# Patient Record
Sex: FEMALE | Race: OTHER | NOT HISPANIC OR LATINO | URBAN - METROPOLITAN AREA
[De-identification: names, ages, dates, MRNs, and addresses within clinical notes are randomized per-mention and may not be internally consistent; named-entity substitution may affect disease eponyms.]

---

## 2017-06-08 ENCOUNTER — EMERGENCY (EMERGENCY)
Facility: HOSPITAL | Age: 42
LOS: 1 days | Discharge: HOME | End: 2017-06-08
Attending: EMERGENCY MEDICINE | Admitting: EMERGENCY MEDICINE
Payer: COMMERCIAL

## 2017-06-08 VITALS
OXYGEN SATURATION: 100 % | DIASTOLIC BLOOD PRESSURE: 81 MMHG | SYSTOLIC BLOOD PRESSURE: 139 MMHG | HEART RATE: 80 BPM | RESPIRATION RATE: 16 BRPM

## 2017-06-08 VITALS
HEART RATE: 83 BPM | SYSTOLIC BLOOD PRESSURE: 135 MMHG | DIASTOLIC BLOOD PRESSURE: 85 MMHG | TEMPERATURE: 98 F | OXYGEN SATURATION: 100 % | RESPIRATION RATE: 16 BRPM

## 2017-06-08 LAB
APPEARANCE UR: CLEAR — SIGNIFICANT CHANGE UP
BACTERIA # UR AUTO: SIGNIFICANT CHANGE UP
BILIRUB UR-MCNC: NEGATIVE — SIGNIFICANT CHANGE UP
BLOOD UR QL VISUAL: HIGH
COLOR SPEC: YELLOW — SIGNIFICANT CHANGE UP
GLUCOSE UR-MCNC: NEGATIVE — SIGNIFICANT CHANGE UP
HYALINE CASTS # UR AUTO: SIGNIFICANT CHANGE UP (ref 0–?)
KETONES UR-MCNC: NEGATIVE — SIGNIFICANT CHANGE UP
LEUKOCYTE ESTERASE UR-ACNC: NEGATIVE — SIGNIFICANT CHANGE UP
MUCOUS THREADS # UR AUTO: SIGNIFICANT CHANGE UP
NITRITE UR-MCNC: NEGATIVE — SIGNIFICANT CHANGE UP
PH UR: 6 — SIGNIFICANT CHANGE UP (ref 4.6–8)
PROT UR-MCNC: 20 — SIGNIFICANT CHANGE UP
RBC CASTS # UR COMP ASSIST: >50 — HIGH (ref 0–?)
SP GR SPEC: 1.02 — SIGNIFICANT CHANGE UP (ref 1–1.03)
SQUAMOUS # UR AUTO: SIGNIFICANT CHANGE UP
UROBILINOGEN FLD QL: NORMAL E.U. — SIGNIFICANT CHANGE UP (ref 0.1–0.2)
WBC UR QL: SIGNIFICANT CHANGE UP (ref 0–?)

## 2017-06-08 PROCEDURE — 99284 EMERGENCY DEPT VISIT MOD MDM: CPT

## 2017-06-08 PROCEDURE — 76830 TRANSVAGINAL US NON-OB: CPT | Mod: 26

## 2017-06-08 RX ORDER — IBUPROFEN 200 MG
600 TABLET ORAL ONCE
Qty: 0 | Refills: 0 | Status: COMPLETED | OUTPATIENT
Start: 2017-06-08 | End: 2017-06-08

## 2017-06-08 NOTE — ED PROVIDER NOTE - MEDICAL DECISION MAKING DETAILS
40 y/o female with known left ovarian cyst c/o right pelvic pain with onset of menses.  R ov cyst vs torsion vs uti vs ectopic vs toa.  Obtain ua, upreg, tvus, give nsaids, antic dc with close ob gyn f/u.

## 2017-06-08 NOTE — ED PROVIDER NOTE - CARE PLAN
Principal Discharge DX:	Pelvic pain in female  Instructions for follow-up, activity and diet:	You were seen today for your pelvic pain.  It is unclear what the cause is at this time.  Take ibuprofen for pain.  Follow up with your ob/gyn in 2-3 days for re-evaluation.  RETURN TO THE EMERGENCY DEPARTMENT IMMEDIATELY IF YOU DEVELOP SEVERE PAIN, CANNOT EAT OR DRINK, OR FOR ANY OTHER CONCERN.

## 2017-06-08 NOTE — ED PROVIDER NOTE - PLAN OF CARE
You were seen today for your pelvic pain.  It is unclear what the cause is at this time.  Take ibuprofen for pain.  Follow up with your ob/gyn in 2-3 days for re-evaluation.  RETURN TO THE EMERGENCY DEPARTMENT IMMEDIATELY IF YOU DEVELOP SEVERE PAIN, CANNOT EAT OR DRINK, OR FOR ANY OTHER CONCERN.

## 2017-06-08 NOTE — ED PROVIDER NOTE - OBJECTIVE STATEMENT
42 y/o female with h/o left ovarian cyst with c/o right pelvic pain x1 day.  Gradual onset, aching in nature, constant, no known aggravating factors, taking ibuprofen with some efficacy.  LMP began yesterday, normal flow.  Denies f/c, ha, neck stiffness, cp, sob, cough, n/v/d, dysuria, rash.

## 2017-06-08 NOTE — ED ADULT NURSE NOTE - OBJECTIVE STATEMENT
Pt. is a 40 y/o female a&oc4 with c/c of abdominal pain across lower abdomen, tender to touch, patient reports pain is more localized to RLQ.  Patient reports 1 out of 10 pain after taking Aleve unknown dose at home.  LMP began yesterday, patient reported this is a week early and this is irregular for her.  Hx of left ovarian cyst.  Denies N/V, urinary and bowel symptoms, fever/chills, chest pain, SOB.  VSS patient in nad at this time.

## 2017-06-08 NOTE — ED ADULT TRIAGE NOTE - CHIEF COMPLAINT QUOTE
Patient c/o of pelvic pain since this morning LMP 6/7/2017 pt report having a cyst on the left ovary. Pt also reports having a stillbirth 9/19/2016.

## 2017-08-21 ENCOUNTER — TRANSCRIPTION ENCOUNTER (OUTPATIENT)
Age: 42
End: 2017-08-21

## 2017-08-22 ENCOUNTER — RESULT REVIEW (OUTPATIENT)
Age: 42
End: 2017-08-22

## 2017-08-22 ENCOUNTER — OUTPATIENT (OUTPATIENT)
Dept: EMERGENCY DEPT | Facility: HOSPITAL | Age: 42
LOS: 1 days | Discharge: HOME | End: 2017-08-22
Payer: COMMERCIAL

## 2017-08-22 VITALS
DIASTOLIC BLOOD PRESSURE: 76 MMHG | OXYGEN SATURATION: 99 % | HEART RATE: 100 BPM | SYSTOLIC BLOOD PRESSURE: 116 MMHG | RESPIRATION RATE: 16 BRPM

## 2017-08-22 VITALS
HEART RATE: 85 BPM | DIASTOLIC BLOOD PRESSURE: 99 MMHG | RESPIRATION RATE: 20 BRPM | TEMPERATURE: 99 F | SYSTOLIC BLOOD PRESSURE: 133 MMHG | OXYGEN SATURATION: 100 %

## 2017-08-22 DIAGNOSIS — N83.519 TORSION OF OVARY AND OVARIAN PEDICLE, UNSPECIFIED SIDE: ICD-10-CM

## 2017-08-22 DIAGNOSIS — N83.202 UNSPECIFIED OVARIAN CYST, LEFT SIDE: ICD-10-CM

## 2017-08-22 LAB
ALBUMIN SERPL ELPH-MCNC: 4.4 G/DL — SIGNIFICANT CHANGE UP (ref 3.3–5)
ALP SERPL-CCNC: 49 U/L — SIGNIFICANT CHANGE UP (ref 40–120)
ALT FLD-CCNC: 11 U/L — SIGNIFICANT CHANGE UP (ref 4–33)
APPEARANCE UR: CLEAR — SIGNIFICANT CHANGE UP
AST SERPL-CCNC: 14 U/L — SIGNIFICANT CHANGE UP (ref 4–32)
BASE EXCESS BLDV CALC-SCNC: 1.5 MMOL/L — SIGNIFICANT CHANGE UP
BASOPHILS # BLD AUTO: 0.03 K/UL — SIGNIFICANT CHANGE UP (ref 0–0.2)
BASOPHILS NFR BLD AUTO: 0.3 % — SIGNIFICANT CHANGE UP (ref 0–2)
BILIRUB SERPL-MCNC: 0.6 MG/DL — SIGNIFICANT CHANGE UP (ref 0.2–1.2)
BILIRUB UR-MCNC: NEGATIVE — SIGNIFICANT CHANGE UP
BLD GP AB SCN SERPL QL: NEGATIVE — SIGNIFICANT CHANGE UP
BLOOD GAS VENOUS - CREATININE: 0.7 MG/DL — SIGNIFICANT CHANGE UP (ref 0.5–1.3)
BLOOD UR QL VISUAL: NEGATIVE — SIGNIFICANT CHANGE UP
BUN SERPL-MCNC: 9 MG/DL — SIGNIFICANT CHANGE UP (ref 7–23)
CALCIUM SERPL-MCNC: 9.7 MG/DL — SIGNIFICANT CHANGE UP (ref 8.4–10.5)
CHLORIDE BLDV-SCNC: 107 MMOL/L — SIGNIFICANT CHANGE UP (ref 96–108)
CHLORIDE SERPL-SCNC: 104 MMOL/L — SIGNIFICANT CHANGE UP (ref 98–107)
CO2 SERPL-SCNC: 22 MMOL/L — SIGNIFICANT CHANGE UP (ref 22–31)
COLOR SPEC: SIGNIFICANT CHANGE UP
CREAT SERPL-MCNC: 0.66 MG/DL — SIGNIFICANT CHANGE UP (ref 0.5–1.3)
EOSINOPHIL # BLD AUTO: 0.12 K/UL — SIGNIFICANT CHANGE UP (ref 0–0.5)
EOSINOPHIL NFR BLD AUTO: 1.3 % — SIGNIFICANT CHANGE UP (ref 0–6)
GAS PNL BLDV: 137 MMOL/L — SIGNIFICANT CHANGE UP (ref 136–146)
GLUCOSE BLDV-MCNC: 90 — SIGNIFICANT CHANGE UP (ref 70–99)
GLUCOSE SERPL-MCNC: 93 MG/DL — SIGNIFICANT CHANGE UP (ref 70–99)
GLUCOSE UR-MCNC: NEGATIVE — SIGNIFICANT CHANGE UP
HCG SERPL-ACNC: < 5 MIU/ML — SIGNIFICANT CHANGE UP
HCO3 BLDV-SCNC: 24 MMOL/L — SIGNIFICANT CHANGE UP (ref 20–27)
HCT VFR BLD CALC: 39.3 % — SIGNIFICANT CHANGE UP (ref 34.5–45)
HCT VFR BLDV CALC: 42.4 % — SIGNIFICANT CHANGE UP (ref 34.5–45)
HGB BLD-MCNC: 13.4 G/DL — SIGNIFICANT CHANGE UP (ref 11.5–15.5)
HGB BLDV-MCNC: 13.8 G/DL — SIGNIFICANT CHANGE UP (ref 11.5–15.5)
IMM GRANULOCYTES # BLD AUTO: 0.04 # — SIGNIFICANT CHANGE UP
IMM GRANULOCYTES NFR BLD AUTO: 0.4 % — SIGNIFICANT CHANGE UP (ref 0–1.5)
KETONES UR-MCNC: NEGATIVE — SIGNIFICANT CHANGE UP
LACTATE BLDV-MCNC: 1 MMOL/L — SIGNIFICANT CHANGE UP (ref 0.5–2)
LEUKOCYTE ESTERASE UR-ACNC: NEGATIVE — SIGNIFICANT CHANGE UP
LIDOCAIN IGE QN: 18.1 U/L — SIGNIFICANT CHANGE UP (ref 7–60)
LYMPHOCYTES # BLD AUTO: 2.75 K/UL — SIGNIFICANT CHANGE UP (ref 1–3.3)
LYMPHOCYTES # BLD AUTO: 30.4 % — SIGNIFICANT CHANGE UP (ref 13–44)
MCHC RBC-ENTMCNC: 31 PG — SIGNIFICANT CHANGE UP (ref 27–34)
MCHC RBC-ENTMCNC: 34.1 % — SIGNIFICANT CHANGE UP (ref 32–36)
MCV RBC AUTO: 91 FL — SIGNIFICANT CHANGE UP (ref 80–100)
MONOCYTES # BLD AUTO: 0.5 K/UL — SIGNIFICANT CHANGE UP (ref 0–0.9)
MONOCYTES NFR BLD AUTO: 5.5 % — SIGNIFICANT CHANGE UP (ref 2–14)
MUCOUS THREADS # UR AUTO: SIGNIFICANT CHANGE UP
NEUTROPHILS # BLD AUTO: 5.62 K/UL — SIGNIFICANT CHANGE UP (ref 1.8–7.4)
NEUTROPHILS NFR BLD AUTO: 62.1 % — SIGNIFICANT CHANGE UP (ref 43–77)
NITRITE UR-MCNC: NEGATIVE — SIGNIFICANT CHANGE UP
NRBC # FLD: 0 — SIGNIFICANT CHANGE UP
PCO2 BLDV: 51 MMHG — SIGNIFICANT CHANGE UP (ref 41–51)
PH BLDV: 7.34 PH — SIGNIFICANT CHANGE UP (ref 7.32–7.43)
PH UR: 7 — SIGNIFICANT CHANGE UP (ref 4.6–8)
PLATELET # BLD AUTO: 266 K/UL — SIGNIFICANT CHANGE UP (ref 150–400)
PMV BLD: 10.8 FL — SIGNIFICANT CHANGE UP (ref 7–13)
PO2 BLDV: 27 MMHG — LOW (ref 35–40)
POTASSIUM BLDV-SCNC: 3.4 MMOL/L — SIGNIFICANT CHANGE UP (ref 3.4–4.5)
POTASSIUM SERPL-MCNC: 3.6 MMOL/L — SIGNIFICANT CHANGE UP (ref 3.5–5.3)
POTASSIUM SERPL-SCNC: 3.6 MMOL/L — SIGNIFICANT CHANGE UP (ref 3.5–5.3)
PROT SERPL-MCNC: 7.8 G/DL — SIGNIFICANT CHANGE UP (ref 6–8.3)
PROT UR-MCNC: NEGATIVE — SIGNIFICANT CHANGE UP
RBC # BLD: 4.32 M/UL — SIGNIFICANT CHANGE UP (ref 3.8–5.2)
RBC # FLD: 12.2 % — SIGNIFICANT CHANGE UP (ref 10.3–14.5)
RBC CASTS # UR COMP ASSIST: SIGNIFICANT CHANGE UP (ref 0–?)
RH IG SCN BLD-IMP: POSITIVE — SIGNIFICANT CHANGE UP
SAO2 % BLDV: 40.1 % — LOW (ref 60–85)
SODIUM SERPL-SCNC: 141 MMOL/L — SIGNIFICANT CHANGE UP (ref 135–145)
SP GR SPEC: 1 — SIGNIFICANT CHANGE UP (ref 1–1.03)
SQUAMOUS # UR AUTO: SIGNIFICANT CHANGE UP
UROBILINOGEN FLD QL: NORMAL E.U. — SIGNIFICANT CHANGE UP (ref 0.1–0.2)
WBC # BLD: 9.06 K/UL — SIGNIFICANT CHANGE UP (ref 3.8–10.5)
WBC # FLD AUTO: 9.06 K/UL — SIGNIFICANT CHANGE UP (ref 3.8–10.5)

## 2017-08-22 PROCEDURE — 88341 IMHCHEM/IMCYTCHM EA ADD ANTB: CPT | Mod: 26

## 2017-08-22 PROCEDURE — 88305 TISSUE EXAM BY PATHOLOGIST: CPT | Mod: 26

## 2017-08-22 PROCEDURE — 88342 IMHCHEM/IMCYTCHM 1ST ANTB: CPT | Mod: 26

## 2017-08-22 RX ORDER — OXYCODONE HYDROCHLORIDE 5 MG/1
1 TABLET ORAL
Qty: 15 | Refills: 0 | OUTPATIENT
Start: 2017-08-22

## 2017-08-22 RX ORDER — SODIUM CHLORIDE 9 MG/ML
1000 INJECTION, SOLUTION INTRAVENOUS
Qty: 0 | Refills: 0 | Status: DISCONTINUED | OUTPATIENT
Start: 2017-08-22 | End: 2017-08-22

## 2017-08-22 RX ORDER — FENTANYL CITRATE 50 UG/ML
50 INJECTION INTRAVENOUS
Qty: 0 | Refills: 0 | Status: DISCONTINUED | OUTPATIENT
Start: 2017-08-22 | End: 2017-08-22

## 2017-08-22 RX ORDER — ONDANSETRON 8 MG/1
4 TABLET, FILM COATED ORAL ONCE
Qty: 0 | Refills: 0 | Status: DISCONTINUED | OUTPATIENT
Start: 2017-08-22 | End: 2017-08-22

## 2017-08-22 RX ADMIN — SODIUM CHLORIDE 125 MILLILITER(S): 9 INJECTION, SOLUTION INTRAVENOUS at 14:15

## 2017-08-22 RX ADMIN — FENTANYL CITRATE 50 MICROGRAM(S): 50 INJECTION INTRAVENOUS at 15:05

## 2017-08-22 RX ADMIN — FENTANYL CITRATE 50 MICROGRAM(S): 50 INJECTION INTRAVENOUS at 14:50

## 2017-08-22 NOTE — H&P ADULT - HISTORY OF PRESENT ILLNESS
42 y/o  LMP  presenting for suprapubic pain that has persisted for the past 5 days. She reports that she originally went to Dr. Ruiz's office on  for this pain but it has intermittently come and go since then. She tried taking a pain medication she does not remember the name of that she was prescribed for it but vomited that medication up. She says the pain is "like labor pain" in nature and that it got bad again this morning so that's why she came in. She denies any current N/V, fevers, chills, CP, SOB, VB, or vaginal discharge  OBHx: 1x  in 2013, 1x 38w fetal demise delivered vaginally in 2016  GynHx: Known left ovarian cyst, previously 10 cm on U/S  PMH: Denies  SHx: Denies  All: NKDA  Meds: None

## 2017-08-22 NOTE — H&P ADULT - NSHPLABSRESULTS_GEN_ALL_CORE
13.4   9.06  )-----------( 266      (  @ 07:15 )             39.3     Urinalysis Basic - ( 22 Aug 2017 07:15 )    Color: PLYEL / Appearance: CLEAR / S.005 / pH: 7.0  Gluc: NEGATIVE / Ketone: NEGATIVE  / Bili: NEGATIVE / Urobili: NORMAL E.U.   Blood: NEGATIVE / Protein: NEGATIVE / Nitrite: NEGATIVE   Leuk Esterase: NEGATIVE / RBC: 0-2 / WBC x   Sq Epi: OCC / Non Sq Epi: x / Bacteria: x    < from: US Transvaginal (17 @ 14:40) >    EXAM:  US TRANSVAGINAL        PROCEDURE DATE:  2017         INTERPRETATION:  CLINICAL INFORMATION: Cramps. Left ovarian cyst.    LMP: 6717    COMPARISON: None available.    TECHNIQUE: Transabdominal and Endovaginal pelvic sonogram.        FINDINGS:    Uterus: 7.9 x 4 x 4.8 cm. Within normal limits.  Endometrium: 7 mm. Within normal limits.  Right ovary: Not visualized.  Left ovary: There is a 10.2 x 9 x 9.2 cm left ovarian cyst.  Free fluid: None.    IMPRESSION:    A 10 cm left ovarian cyst; given the size, torsion should be excluded   clinically.    Right ovary is not visualized.    < end of copied text >

## 2017-08-22 NOTE — ED ADULT TRIAGE NOTE - CHIEF COMPLAINT QUOTE
Pt. developed lower abdominal pain radiating to right lower back that started 2 days ago. Pt. appears comfortable at triage. LMP 8/2/17.

## 2017-08-22 NOTE — H&P ADULT - ASSESSMENT
40 y/o  LMP  with pelvic pain over pelvic mass and known 10 cm ovarian cyst from TVUS on , strong concern for ovarian torsion

## 2017-08-22 NOTE — ED ADULT NURSE NOTE - OBJECTIVE STATEMENT
rec'd pt in room 19 c/o suprapubic pain for 1 day... states ha hx of left ovarian cyst that had enlarged recently...told by her obgyn if pain incrased to come to ed.  denies nausea, vomiting, diarrhea, dysuria, vag discharbe.  appears comfortable. 20 gauge inserted rigth ac. blood and urine sent.  being examined by md.

## 2017-08-22 NOTE — ASU DISCHARGE PLAN (ADULT/PEDIATRIC). - NURSING INSTRUCTIONS
Stable for discharge  Do not take pain medication on an empty stomach.  Increase fluids and fiber in diet to prevent constipation. Stable for discharge

## 2017-08-22 NOTE — ASU DISCHARGE PLAN (ADULT/PEDIATRIC). - NOTIFY
Swelling that continues/Inability to Tolerate Liquids or Foods/Unable to Urinate/Persistent Nausea and Vomiting/GYN Fever>100.4/Pain not relieved by Medications/Bleeding that does not stop

## 2017-08-22 NOTE — ED PROVIDER NOTE - MEDICAL DECISION MAKING DETAILS
40yo F pmh 10cm L ovarian cyst p/w suprapubic abdominal pain radiating to RLQ & R flank. likely 2/2 to ovarian cyst vs ruptured cyst vs fibroid. no concerns for appendicitis, nephrolithiasis or ovarian torsion on exam. will r/o pregnancy & ectopic pregnancy  -labs -tvus -hcg -analgesia -obgyn consulted

## 2017-08-22 NOTE — ASU DISCHARGE PLAN (ADULT/PEDIATRIC). - ACTIVITY LEVEL
no heavy lifting/no intercourse/no douching/nothing per rectum/no tub baths/weight bearing as tolerated/nothing per vagina/no tampons no intercourse/weight bearing as tolerated/no sports/gym/nothing per rectum/no douching/no heavy lifting/nothing per vagina/no tampons/no tub baths

## 2017-08-22 NOTE — ED PROVIDER NOTE - ATTENDING CONTRIBUTION TO CARE
I was physically present for the E/M service provided. I agree with above history, physical, and plan which I have reviewed and edited where appropriate. I was physically present for the key portions of the service provided.    41F PMH left ovarian cysts p/w 4 days suprapubic pain a/w mild dysuria. No N/V/D. Sent by her gyn to ED. Abdomen soft, + suprapubic TTP. TV US evaluated for cysts, UA r/o UTI vs hematuria.

## 2017-08-22 NOTE — H&P ADULT - PROBLEM SELECTOR PLAN 1
- Pt added on to the OR schedule for laparoscopic left ovarian cystectomy, possible detorsion, possible salpingectomy, possible oophorectomy, possible exploratory laparotomy. Consents signed  - T&S and PT/PTT/INR  - NPO    Pt seen and examined with Dr. Alex Bernard, PGY-2  [phone]

## 2017-08-22 NOTE — BRIEF OPERATIVE NOTE - PROCEDURE
Ovarian torsion release  08/22/2017    Active  [facility]  Ovarian cystectomy  08/22/2017    Active  [facility]

## 2017-08-22 NOTE — ASU DISCHARGE PLAN (ADULT/PEDIATRIC). - POST OP PHONE #
pt. granted permission to leave message /and or speak with whoever answers the phone. pt. granted permission to leave message /and or speak with whoever answers the phone. [phone]

## 2017-08-22 NOTE — ED ADULT NURSE REASSESSMENT NOTE - NS ED NURSE REASSESS COMMENT FT1
pt transported to OR, report to BUD Chaudhry. Belongings brought to security. pt left ED via stretcher and in nad, offers no complaints at this time. safety maintained.
received report from BUD DYSON at 0815 while pt was in ultrasound. pt returned from sonogram, presents awake a&ox4, denies dizziness or ha., skin warm, dry, appropriate for race. respirations even unlabored. denies cp or sob. abdomen soft nontender nondistended. bs+x4, denies n/v/d denies fever or chills. vs as noted. ivl site clean, dry, intact, patent. pt/ptt, t&s drawn and sent to lab. pt admitted to hospital. awaiting bed assignment at this time.

## 2017-08-22 NOTE — BRIEF OPERATIVE NOTE - OPERATION/FINDINGS
Large right ovarian simple cyst, partial torsion of right ovary with no obvious necrosis of right ovary or salpinx, grossly normal uterus, grossly normal left salpinx and ovary

## 2017-08-22 NOTE — ASU DISCHARGE PLAN (ADULT/PEDIATRIC). - MEDICATION SUMMARY - MEDICATIONS TO TAKE
I will START or STAY ON the medications listed below when I get home from the hospital:    oxyCODONE 5 mg oral tablet  -- 1 tab(s) by mouth every 6 hours MDD:4 tabs  -- Caution federal law prohibits the transfer of this drug to any person other  than the person for whom it was prescribed.  It is very important that you take or use this exactly as directed.  Do not skip doses or discontinue unless directed by your doctor.  May cause drowsiness.  Alcohol may intensify this effect.  Use care when operating dangerous machinery.  This prescription cannot be refilled.  Using more of this medication than prescribed may cause serious breathing problems.    -- Indication: For Pain    acetaminophen 500 mg oral tablet  -- 2 tab(s) by mouth every 6 hours, As Needed  -- Indication: For Pain    ibuprofen 200 mg oral tablet  -- 3 tab(s) by mouth every 6 hours, As Needed  -- Indication: For Pain

## 2017-08-22 NOTE — ED PROVIDER NOTE - OBJECTIVE STATEMENT
40yo F pmh 10cm L ovarian cyst p/w suprapubic abdominal pain radiating to RLQ & R flank, 4days, worsening, not relieved by alleve. lmp 8/2/17. denies any vaginal bleeding, n/v/d, f/c, dysuria, vaginal dc, hx nephrolithiasis, sti. pt called aisha paris covering for pts ob hakan mckeon who told pt to come to ed

## 2017-08-22 NOTE — H&P ADULT - NSHPPHYSICALEXAM_GEN_ALL_CORE
VS  T(C): 36.7 (08-22-17 @ 07:24)  HR: 81 (08-22-17 @ 07:24)  BP: 132/84 (08-22-17 @ 07:24)  RR: 18 (08-22-17 @ 07:24)  SpO2: 100% (08-22-17 @ 07:24)    Physical Exam:  General: NAD  Abdomen: Soft, tender suprapubically, non-distended  Pelvic: Labia wnl w/o lesions or erythema, no discharge or bleeding in vaginal vault, no cervical discharge or erythema, no CMT, large mass in pt's suprapubic area difficult to differentiate between uterus and previous 10 cm ovarian cyst on exam, pt with pain to palpation over this mass, adnexa otherwise not easy to palpate.

## 2017-08-22 NOTE — ASU DISCHARGE PLAN (ADULT/PEDIATRIC). - SPECIAL INSTRUCTIONS
Stable for discharge Stable for discharge  Do not take pain medication on an empty stomach.  Increase fluids and fiber in diet to prevent constipation.

## 2017-08-22 NOTE — BRIEF OPERATIVE NOTE - POST-OP DX
Ovarian cyst  08/22/2017    Active  Simeon Bernard  Ovarian torsion  08/22/2017    Active  Simeon Bernard

## 2017-08-23 LAB — NON-GYNECOLOGICAL CYTOLOGY STUDY: SIGNIFICANT CHANGE UP

## 2017-08-30 LAB — SURGICAL PATHOLOGY STUDY: SIGNIFICANT CHANGE UP

## 2018-05-21 ENCOUNTER — OUTPATIENT (OUTPATIENT)
Dept: OUTPATIENT SERVICES | Facility: HOSPITAL | Age: 43
LOS: 1 days | End: 2018-05-21

## 2018-05-21 VITALS
WEIGHT: 227.08 LBS | TEMPERATURE: 99 F | HEIGHT: 67 IN | SYSTOLIC BLOOD PRESSURE: 128 MMHG | DIASTOLIC BLOOD PRESSURE: 80 MMHG | HEART RATE: 94 BPM | RESPIRATION RATE: 16 BRPM

## 2018-05-21 DIAGNOSIS — Z98.890 OTHER SPECIFIED POSTPROCEDURAL STATES: Chronic | ICD-10-CM

## 2018-05-21 DIAGNOSIS — Z87.59 PERSONAL HISTORY OF OTHER COMPLICATIONS OF PREGNANCY, CHILDBIRTH AND THE PUERPERIUM: Chronic | ICD-10-CM

## 2018-05-21 LAB
APPEARANCE UR: CLEAR — SIGNIFICANT CHANGE UP
BACTERIA # UR AUTO: SIGNIFICANT CHANGE UP
BASOPHILS # BLD AUTO: 0.05 K/UL — SIGNIFICANT CHANGE UP (ref 0–0.2)
BASOPHILS NFR BLD AUTO: 0.4 % — SIGNIFICANT CHANGE UP (ref 0–2)
BILIRUB UR-MCNC: NEGATIVE — SIGNIFICANT CHANGE UP
BLD GP AB SCN SERPL QL: NEGATIVE — SIGNIFICANT CHANGE UP
BLOOD UR QL VISUAL: NEGATIVE — SIGNIFICANT CHANGE UP
COLOR SPEC: YELLOW — SIGNIFICANT CHANGE UP
EOSINOPHIL # BLD AUTO: 0.11 K/UL — SIGNIFICANT CHANGE UP (ref 0–0.5)
EOSINOPHIL NFR BLD AUTO: 0.8 % — SIGNIFICANT CHANGE UP (ref 0–6)
GLUCOSE UR-MCNC: NEGATIVE — SIGNIFICANT CHANGE UP
HCT VFR BLD CALC: 35.3 % — SIGNIFICANT CHANGE UP (ref 34.5–45)
HCT VFR BLD CALC: 35.3 % — SIGNIFICANT CHANGE UP (ref 34.5–45)
HGB BLD-MCNC: 11.8 G/DL — SIGNIFICANT CHANGE UP (ref 11.5–15.5)
HGB BLD-MCNC: 11.8 G/DL — SIGNIFICANT CHANGE UP (ref 11.5–15.5)
IMM GRANULOCYTES # BLD AUTO: 0.2 # — SIGNIFICANT CHANGE UP
IMM GRANULOCYTES NFR BLD AUTO: 1.5 % — SIGNIFICANT CHANGE UP (ref 0–1.5)
KETONES UR-MCNC: NEGATIVE — SIGNIFICANT CHANGE UP
LEUKOCYTE ESTERASE UR-ACNC: HIGH
LYMPHOCYTES # BLD AUTO: 2.79 K/UL — SIGNIFICANT CHANGE UP (ref 1–3.3)
LYMPHOCYTES # BLD AUTO: 20.4 % — SIGNIFICANT CHANGE UP (ref 13–44)
MCHC RBC-ENTMCNC: 30.3 PG — SIGNIFICANT CHANGE UP (ref 27–34)
MCHC RBC-ENTMCNC: 30.3 PG — SIGNIFICANT CHANGE UP (ref 27–34)
MCHC RBC-ENTMCNC: 33.4 % — SIGNIFICANT CHANGE UP (ref 32–36)
MCHC RBC-ENTMCNC: 33.4 % — SIGNIFICANT CHANGE UP (ref 32–36)
MCV RBC AUTO: 90.5 FL — SIGNIFICANT CHANGE UP (ref 80–100)
MCV RBC AUTO: 90.5 FL — SIGNIFICANT CHANGE UP (ref 80–100)
MONOCYTES # BLD AUTO: 0.88 K/UL — SIGNIFICANT CHANGE UP (ref 0–0.9)
MONOCYTES NFR BLD AUTO: 6.4 % — SIGNIFICANT CHANGE UP (ref 2–14)
NEUTROPHILS # BLD AUTO: 9.66 K/UL — HIGH (ref 1.8–7.4)
NEUTROPHILS NFR BLD AUTO: 70.5 % — SIGNIFICANT CHANGE UP (ref 43–77)
NITRITE UR-MCNC: NEGATIVE — SIGNIFICANT CHANGE UP
NRBC # FLD: 0 — SIGNIFICANT CHANGE UP
NRBC # FLD: 0 — SIGNIFICANT CHANGE UP
PH UR: 7 — SIGNIFICANT CHANGE UP (ref 5–8)
PLATELET # BLD AUTO: 211 K/UL — SIGNIFICANT CHANGE UP (ref 150–400)
PLATELET # BLD AUTO: 211 K/UL — SIGNIFICANT CHANGE UP (ref 150–400)
PMV BLD: 10.8 FL — SIGNIFICANT CHANGE UP (ref 7–13)
PMV BLD: 10.8 FL — SIGNIFICANT CHANGE UP (ref 7–13)
PROT UR-MCNC: NEGATIVE MG/DL — SIGNIFICANT CHANGE UP
RBC # BLD: 3.9 M/UL — SIGNIFICANT CHANGE UP (ref 3.8–5.2)
RBC # BLD: 3.9 M/UL — SIGNIFICANT CHANGE UP (ref 3.8–5.2)
RBC # FLD: 13.5 % — SIGNIFICANT CHANGE UP (ref 10.3–14.5)
RBC # FLD: 13.5 % — SIGNIFICANT CHANGE UP (ref 10.3–14.5)
RBC CASTS # UR COMP ASSIST: SIGNIFICANT CHANGE UP (ref 0–?)
RH IG SCN BLD-IMP: POSITIVE — SIGNIFICANT CHANGE UP
SP GR SPEC: 1.01 — SIGNIFICANT CHANGE UP (ref 1–1.04)
SQUAMOUS # UR AUTO: SIGNIFICANT CHANGE UP
UROBILINOGEN FLD QL: 0.2 MG/DL — SIGNIFICANT CHANGE UP
WBC # BLD: 13.69 K/UL — HIGH (ref 3.8–10.5)
WBC # BLD: 13.69 K/UL — HIGH (ref 3.8–10.5)
WBC # FLD AUTO: 13.69 K/UL — HIGH (ref 3.8–10.5)
WBC # FLD AUTO: 13.69 K/UL — HIGH (ref 3.8–10.5)
WBC UR QL: SIGNIFICANT CHANGE UP (ref 0–?)

## 2018-05-21 RX ORDER — IBUPROFEN 200 MG
3 TABLET ORAL
Qty: 0 | Refills: 0 | COMMUNITY

## 2018-05-21 RX ORDER — ACETAMINOPHEN 500 MG
2 TABLET ORAL
Qty: 0 | Refills: 0 | COMMUNITY

## 2018-05-21 NOTE — H&P PST ADULT - PROBLEM SELECTOR PLAN 1
Pt. is scheduled for induction of labor for history of fetal demise on 5/23/18.  Preprocedure instructions provided, pt. verbalized understanding.  Lab results pending

## 2018-05-21 NOTE — H&P PST ADULT - HISTORY OF PRESENT ILLNESS
41 y/o pregnant female presents to PAST today for presurgical evaluation.  . 41 y/o pregnant female presents to PAST today for presurgical evaluation.  . LMP 17, CARLOS, 18.  She is s/p  on 13 for 8lb 7oz healthy full term female and induction of labor for stillborn birth on 16.  She had a laparoscopic ovarian cystectomy on 17.  She denies any problems with this pregnancy and feels well today.  She is scheduled for Induction of labor for history of fetal demise on 18.

## 2018-05-21 NOTE — H&P PST ADULT - PSH
History of stillbirth  16   (normal spontaneous vaginal delivery)  13  Status post laparoscopic surgery  left ovarian cystectomy (Aug 2017)

## 2018-05-22 LAB — T PALLIDUM AB TITR SER: NEGATIVE — SIGNIFICANT CHANGE UP

## 2018-05-23 ENCOUNTER — INPATIENT (INPATIENT)
Facility: HOSPITAL | Age: 43
LOS: 1 days | Discharge: HOME | End: 2018-05-25
Attending: SPECIALIST | Admitting: SPECIALIST

## 2018-05-23 ENCOUNTER — TRANSCRIPTION ENCOUNTER (OUTPATIENT)
Age: 43
End: 2018-05-23

## 2018-05-23 VITALS — WEIGHT: 227.08 LBS | HEIGHT: 67 IN

## 2018-05-23 DIAGNOSIS — Z87.59 PERSONAL HISTORY OF OTHER COMPLICATIONS OF PREGNANCY, CHILDBIRTH AND THE PUERPERIUM: ICD-10-CM

## 2018-05-23 DIAGNOSIS — Z98.890 OTHER SPECIFIED POSTPROCEDURAL STATES: Chronic | ICD-10-CM

## 2018-05-23 DIAGNOSIS — Z87.59 PERSONAL HISTORY OF OTHER COMPLICATIONS OF PREGNANCY, CHILDBIRTH AND THE PUERPERIUM: Chronic | ICD-10-CM

## 2018-05-23 LAB
BASOPHILS # BLD AUTO: 0.02 K/UL — SIGNIFICANT CHANGE UP (ref 0–0.2)
BASOPHILS NFR BLD AUTO: 0.2 % — SIGNIFICANT CHANGE UP (ref 0–2)
BLD GP AB SCN SERPL QL: NEGATIVE — SIGNIFICANT CHANGE UP
EOSINOPHIL # BLD AUTO: 0.08 K/UL — SIGNIFICANT CHANGE UP (ref 0–0.5)
EOSINOPHIL NFR BLD AUTO: 0.7 % — SIGNIFICANT CHANGE UP (ref 0–6)
HCT VFR BLD CALC: 33 % — LOW (ref 34.5–45)
HGB BLD-MCNC: 11.1 G/DL — LOW (ref 11.5–15.5)
IMM GRANULOCYTES # BLD AUTO: 0.18 # — SIGNIFICANT CHANGE UP
IMM GRANULOCYTES NFR BLD AUTO: 1.6 % — HIGH (ref 0–1.5)
LYMPHOCYTES # BLD AUTO: 17.4 % — SIGNIFICANT CHANGE UP (ref 13–44)
LYMPHOCYTES # BLD AUTO: 2 K/UL — SIGNIFICANT CHANGE UP (ref 1–3.3)
MCHC RBC-ENTMCNC: 30.6 PG — SIGNIFICANT CHANGE UP (ref 27–34)
MCHC RBC-ENTMCNC: 33.6 % — SIGNIFICANT CHANGE UP (ref 32–36)
MCV RBC AUTO: 90.9 FL — SIGNIFICANT CHANGE UP (ref 80–100)
MONOCYTES # BLD AUTO: 0.69 K/UL — SIGNIFICANT CHANGE UP (ref 0–0.9)
MONOCYTES NFR BLD AUTO: 6 % — SIGNIFICANT CHANGE UP (ref 2–14)
NEUTROPHILS # BLD AUTO: 8.54 K/UL — HIGH (ref 1.8–7.4)
NEUTROPHILS NFR BLD AUTO: 74.1 % — SIGNIFICANT CHANGE UP (ref 43–77)
NRBC # FLD: 0 — SIGNIFICANT CHANGE UP
PLATELET # BLD AUTO: 216 K/UL — SIGNIFICANT CHANGE UP (ref 150–400)
PMV BLD: 11 FL — SIGNIFICANT CHANGE UP (ref 7–13)
RBC # BLD: 3.63 M/UL — LOW (ref 3.8–5.2)
RBC # FLD: 13.5 % — SIGNIFICANT CHANGE UP (ref 10.3–14.5)
RH IG SCN BLD-IMP: POSITIVE — SIGNIFICANT CHANGE UP
RH IG SCN BLD-IMP: POSITIVE — SIGNIFICANT CHANGE UP
T PALLIDUM AB TITR SER: NEGATIVE — SIGNIFICANT CHANGE UP
WBC # BLD: 11.51 K/UL — HIGH (ref 3.8–10.5)
WBC # FLD AUTO: 11.51 K/UL — HIGH (ref 3.8–10.5)

## 2018-05-23 RX ORDER — AMPICILLIN TRIHYDRATE 250 MG
2 CAPSULE ORAL ONCE
Qty: 0 | Refills: 0 | Status: COMPLETED | OUTPATIENT
Start: 2018-05-23 | End: 2018-05-23

## 2018-05-23 RX ORDER — AMPICILLIN TRIHYDRATE 250 MG
CAPSULE ORAL
Qty: 0 | Refills: 0 | Status: DISCONTINUED | OUTPATIENT
Start: 2018-05-23 | End: 2018-05-24

## 2018-05-23 RX ORDER — AMPICILLIN TRIHYDRATE 250 MG
1 CAPSULE ORAL EVERY 4 HOURS
Qty: 0 | Refills: 0 | Status: DISCONTINUED | OUTPATIENT
Start: 2018-05-23 | End: 2018-05-24

## 2018-05-23 RX ORDER — CITRIC ACID/SODIUM CITRATE 300-500 MG
15 SOLUTION, ORAL ORAL EVERY 4 HOURS
Qty: 0 | Refills: 0 | Status: DISCONTINUED | OUTPATIENT
Start: 2018-05-23 | End: 2018-05-24

## 2018-05-23 RX ORDER — SODIUM CHLORIDE 9 MG/ML
1000 INJECTION, SOLUTION INTRAVENOUS
Qty: 0 | Refills: 0 | Status: DISCONTINUED | OUTPATIENT
Start: 2018-05-23 | End: 2018-05-24

## 2018-05-23 RX ORDER — OXYTOCIN 10 UNIT/ML
333.33 VIAL (ML) INJECTION
Qty: 20 | Refills: 0 | Status: DISCONTINUED | OUTPATIENT
Start: 2018-05-23 | End: 2018-05-24

## 2018-05-23 RX ORDER — SODIUM CHLORIDE 9 MG/ML
1000 INJECTION, SOLUTION INTRAVENOUS ONCE
Qty: 0 | Refills: 0 | Status: COMPLETED | OUTPATIENT
Start: 2018-05-23 | End: 2018-05-23

## 2018-05-23 RX ORDER — OXYTOCIN 10 UNIT/ML
2 VIAL (ML) INJECTION
Qty: 30 | Refills: 0 | Status: DISCONTINUED | OUTPATIENT
Start: 2018-05-23 | End: 2018-05-24

## 2018-05-23 RX ADMIN — Medication 2 MILLIUNIT(S)/MIN: at 20:34

## 2018-05-23 RX ADMIN — Medication 116 GRAM(S): at 11:59

## 2018-05-23 RX ADMIN — Medication 108 GRAM(S): at 20:15

## 2018-05-23 RX ADMIN — SODIUM CHLORIDE 2000 MILLILITER(S): 9 INJECTION, SOLUTION INTRAVENOUS at 19:16

## 2018-05-23 RX ADMIN — Medication 108 GRAM(S): at 16:00

## 2018-05-23 RX ADMIN — SODIUM CHLORIDE 250 MILLILITER(S): 9 INJECTION, SOLUTION INTRAVENOUS at 16:48

## 2018-05-24 RX ORDER — GLYCERIN ADULT
1 SUPPOSITORY, RECTAL RECTAL AT BEDTIME
Qty: 0 | Refills: 0 | Status: DISCONTINUED | OUTPATIENT
Start: 2018-05-24 | End: 2018-05-25

## 2018-05-24 RX ORDER — DIPHENHYDRAMINE HCL 50 MG
25 CAPSULE ORAL EVERY 6 HOURS
Qty: 0 | Refills: 0 | Status: DISCONTINUED | OUTPATIENT
Start: 2018-05-24 | End: 2018-05-25

## 2018-05-24 RX ORDER — DIBUCAINE 1 %
1 OINTMENT (GRAM) RECTAL EVERY 4 HOURS
Qty: 0 | Refills: 0 | Status: DISCONTINUED | OUTPATIENT
Start: 2018-05-24 | End: 2018-05-24

## 2018-05-24 RX ORDER — OXYCODONE HYDROCHLORIDE 5 MG/1
5 TABLET ORAL EVERY 4 HOURS
Qty: 0 | Refills: 0 | Status: DISCONTINUED | OUTPATIENT
Start: 2018-05-24 | End: 2018-05-25

## 2018-05-24 RX ORDER — IBUPROFEN 200 MG
600 TABLET ORAL EVERY 6 HOURS
Qty: 0 | Refills: 0 | Status: COMPLETED | OUTPATIENT
Start: 2018-05-24 | End: 2019-04-22

## 2018-05-24 RX ORDER — IBUPROFEN 200 MG
600 TABLET ORAL EVERY 6 HOURS
Qty: 0 | Refills: 0 | Status: DISCONTINUED | OUTPATIENT
Start: 2018-05-24 | End: 2018-05-25

## 2018-05-24 RX ORDER — ACETAMINOPHEN 500 MG
975 TABLET ORAL EVERY 6 HOURS
Qty: 0 | Refills: 0 | Status: COMPLETED | OUTPATIENT
Start: 2018-05-24 | End: 2019-04-22

## 2018-05-24 RX ORDER — LANOLIN
1 OINTMENT (GRAM) TOPICAL EVERY 6 HOURS
Qty: 0 | Refills: 0 | Status: DISCONTINUED | OUTPATIENT
Start: 2018-05-24 | End: 2018-05-25

## 2018-05-24 RX ORDER — SIMETHICONE 80 MG/1
80 TABLET, CHEWABLE ORAL EVERY 6 HOURS
Qty: 0 | Refills: 0 | Status: DISCONTINUED | OUTPATIENT
Start: 2018-05-24 | End: 2018-05-25

## 2018-05-24 RX ORDER — AER TRAVELER 0.5 G/1
1 SOLUTION RECTAL; TOPICAL EVERY 4 HOURS
Qty: 0 | Refills: 0 | Status: DISCONTINUED | OUTPATIENT
Start: 2018-05-24 | End: 2018-05-24

## 2018-05-24 RX ORDER — OXYTOCIN 10 UNIT/ML
41.67 VIAL (ML) INJECTION
Qty: 20 | Refills: 0 | Status: DISCONTINUED | OUTPATIENT
Start: 2018-05-24 | End: 2018-05-24

## 2018-05-24 RX ORDER — AER TRAVELER 0.5 G/1
1 SOLUTION RECTAL; TOPICAL EVERY 4 HOURS
Qty: 0 | Refills: 0 | Status: DISCONTINUED | OUTPATIENT
Start: 2018-05-24 | End: 2018-05-25

## 2018-05-24 RX ORDER — DIBUCAINE 1 %
1 OINTMENT (GRAM) RECTAL EVERY 4 HOURS
Qty: 0 | Refills: 0 | Status: DISCONTINUED | OUTPATIENT
Start: 2018-05-24 | End: 2018-05-25

## 2018-05-24 RX ORDER — HYDROCORTISONE 1 %
1 OINTMENT (GRAM) TOPICAL EVERY 4 HOURS
Qty: 0 | Refills: 0 | Status: DISCONTINUED | OUTPATIENT
Start: 2018-05-24 | End: 2018-05-25

## 2018-05-24 RX ORDER — DOCUSATE SODIUM 100 MG
100 CAPSULE ORAL
Qty: 0 | Refills: 0 | Status: DISCONTINUED | OUTPATIENT
Start: 2018-05-24 | End: 2018-05-25

## 2018-05-24 RX ORDER — ACETAMINOPHEN 500 MG
975 TABLET ORAL EVERY 6 HOURS
Qty: 0 | Refills: 0 | Status: DISCONTINUED | OUTPATIENT
Start: 2018-05-24 | End: 2018-05-25

## 2018-05-24 RX ORDER — HYDROCORTISONE 1 %
1 OINTMENT (GRAM) TOPICAL EVERY 4 HOURS
Qty: 0 | Refills: 0 | Status: DISCONTINUED | OUTPATIENT
Start: 2018-05-24 | End: 2018-05-24

## 2018-05-24 RX ORDER — OXYCODONE HYDROCHLORIDE 5 MG/1
5 TABLET ORAL
Qty: 0 | Refills: 0 | Status: DISCONTINUED | OUTPATIENT
Start: 2018-05-24 | End: 2018-05-25

## 2018-05-24 RX ORDER — KETOROLAC TROMETHAMINE 30 MG/ML
30 SYRINGE (ML) INJECTION ONCE
Qty: 0 | Refills: 0 | Status: DISCONTINUED | OUTPATIENT
Start: 2018-05-24 | End: 2018-05-24

## 2018-05-24 RX ORDER — PRAMOXINE HYDROCHLORIDE 150 MG/15G
1 AEROSOL, FOAM RECTAL EVERY 4 HOURS
Qty: 0 | Refills: 0 | Status: DISCONTINUED | OUTPATIENT
Start: 2018-05-24 | End: 2018-05-24

## 2018-05-24 RX ORDER — SODIUM CHLORIDE 9 MG/ML
3 INJECTION INTRAMUSCULAR; INTRAVENOUS; SUBCUTANEOUS EVERY 8 HOURS
Qty: 0 | Refills: 0 | Status: DISCONTINUED | OUTPATIENT
Start: 2018-05-24 | End: 2018-05-24

## 2018-05-24 RX ORDER — SODIUM CHLORIDE 9 MG/ML
3 INJECTION INTRAMUSCULAR; INTRAVENOUS; SUBCUTANEOUS EVERY 8 HOURS
Qty: 0 | Refills: 0 | Status: DISCONTINUED | OUTPATIENT
Start: 2018-05-24 | End: 2018-05-25

## 2018-05-24 RX ORDER — PRAMOXINE HYDROCHLORIDE 150 MG/15G
1 AEROSOL, FOAM RECTAL EVERY 4 HOURS
Qty: 0 | Refills: 0 | Status: DISCONTINUED | OUTPATIENT
Start: 2018-05-24 | End: 2018-05-25

## 2018-05-24 RX ORDER — MAGNESIUM HYDROXIDE 400 MG/1
30 TABLET, CHEWABLE ORAL
Qty: 0 | Refills: 0 | Status: DISCONTINUED | OUTPATIENT
Start: 2018-05-24 | End: 2018-05-25

## 2018-05-24 RX ORDER — TETANUS TOXOID, REDUCED DIPHTHERIA TOXOID AND ACELLULAR PERTUSSIS VACCINE, ADSORBED 5; 2.5; 8; 8; 2.5 [IU]/.5ML; [IU]/.5ML; UG/.5ML; UG/.5ML; UG/.5ML
0.5 SUSPENSION INTRAMUSCULAR ONCE
Qty: 0 | Refills: 0 | Status: COMPLETED | OUTPATIENT
Start: 2018-05-24

## 2018-05-24 RX ADMIN — Medication 125 MILLIUNIT(S)/MIN: at 00:42

## 2018-05-24 RX ADMIN — Medication 975 MILLIGRAM(S): at 20:51

## 2018-05-24 RX ADMIN — SODIUM CHLORIDE 3 MILLILITER(S): 9 INJECTION INTRAMUSCULAR; INTRAVENOUS; SUBCUTANEOUS at 04:25

## 2018-05-24 RX ADMIN — Medication 100 MILLIGRAM(S): at 11:20

## 2018-05-24 RX ADMIN — Medication 1 TABLET(S): at 11:20

## 2018-05-24 RX ADMIN — Medication 975 MILLIGRAM(S): at 20:14

## 2018-05-24 NOTE — DISCHARGE NOTE OB - CARE PROVIDER_API CALL
Yoshi Ruiz), Obstetrics and Gynecology  [address] [place] [address]  Phone: [phone]  Fax: [phone]

## 2018-05-24 NOTE — PROGRESS NOTE ADULT - SUBJECTIVE AND OBJECTIVE BOX
S: Patient doing well.     Pain controlled.  +OOB.  +void.    Tolerating PO.  Lochia WNL.    O: Vital Signs Last 24 Hrs  T(C): 36.8 (24 May 2018 01:55), Max: 36.9 (23 May 2018 23:56)  T(F): 98.2 (24 May 2018 01:55), Max: 98.4 (23 May 2018 23:56)  HR: 81 (24 May 2018 01:55) (66 - 87)  BP: 124/68 (24 May 2018 01:55) (113/56 - 140/84)  BP(mean): --  RR: 18 (24 May 2018 01:55) (15 - 18)  SpO2: 100% (24 May 2018 01:55) (100% - 100%)      Pt without complaints  vital signs stable  Abdomen soft  fundus firm, non tender  extremities non tender  lochia wnl    Patient doing well  Routine post partum care    Labs:                        11.1   11.51 )-----------( 216      ( 23 May 2018 12:00 )             33.0       ABO Interpretation: A ( @ 11:56)    Rh Interpretation: Positive ( @ 11:56)    Antibody Screen Negative      A: 42y s/p  doing well.   -Continue routine postpartum care.  -Discharge planned for tomorrow

## 2018-05-24 NOTE — DISCHARGE NOTE OB - CARE PLAN
Principal Discharge DX:	History of stillbirth  Goal:	routine  Assessment and plan of treatment:	routine

## 2018-05-24 NOTE — DISCHARGE NOTE OB - PATIENT PORTAL LINK FT
[facility] can access [facility] offered by [facility], by registering with the following website: [email or web address]

## 2018-05-25 VITALS
RESPIRATION RATE: 18 BRPM | SYSTOLIC BLOOD PRESSURE: 119 MMHG | DIASTOLIC BLOOD PRESSURE: 66 MMHG | TEMPERATURE: 98 F | OXYGEN SATURATION: 98 % | HEART RATE: 79 BPM

## 2018-05-25 RX ORDER — TETANUS TOXOID, REDUCED DIPHTHERIA TOXOID AND ACELLULAR PERTUSSIS VACCINE, ADSORBED 5; 2.5; 8; 8; 2.5 [IU]/.5ML; [IU]/.5ML; UG/.5ML; UG/.5ML; UG/.5ML
0.5 SUSPENSION INTRAMUSCULAR ONCE
Qty: 0 | Refills: 0 | Status: COMPLETED | OUTPATIENT
Start: 2018-05-25 | End: 2018-05-25

## 2018-05-25 RX ADMIN — Medication 975 MILLIGRAM(S): at 07:55

## 2018-05-25 RX ADMIN — MAGNESIUM HYDROXIDE 30 MILLILITER(S): 400 TABLET, CHEWABLE ORAL at 12:56

## 2018-05-25 RX ADMIN — Medication 975 MILLIGRAM(S): at 06:26

## 2018-05-25 RX ADMIN — TETANUS TOXOID, REDUCED DIPHTHERIA TOXOID AND ACELLULAR PERTUSSIS VACCINE, ADSORBED 0.5 MILLILITER(S): 5; 2.5; 8; 8; 2.5 SUSPENSION INTRAMUSCULAR at 06:25

## 2018-05-25 RX ADMIN — Medication 100 MILLIGRAM(S): at 12:56

## 2018-05-25 RX ADMIN — Medication 600 MILLIGRAM(S): at 12:56

## 2018-05-25 RX ADMIN — Medication 975 MILLIGRAM(S): at 12:56

## 2018-09-26 PROBLEM — N83.202 UNSPECIFIED OVARIAN CYST, LEFT SIDE: Chronic | Status: ACTIVE | Noted: 2017-06-08

## 2018-09-27 PROBLEM — Z00.00 ENCOUNTER FOR PREVENTIVE HEALTH EXAMINATION: Status: ACTIVE | Noted: 2018-09-27

## 2018-10-02 ENCOUNTER — APPOINTMENT (OUTPATIENT)
Dept: GYNECOLOGIC ONCOLOGY | Facility: CLINIC | Age: 43
End: 2018-10-02
Payer: MEDICAID

## 2018-10-02 VITALS
WEIGHT: 214.5 LBS | DIASTOLIC BLOOD PRESSURE: 87 MMHG | SYSTOLIC BLOOD PRESSURE: 130 MMHG | BODY MASS INDEX: 33.67 KG/M2 | HEIGHT: 67 IN | HEART RATE: 96 BPM

## 2018-10-02 DIAGNOSIS — N94.9 UNSPECIFIED CONDITION ASSOCIATED WITH FEMALE GENITAL ORGANS AND MENSTRUAL CYCLE: ICD-10-CM

## 2018-10-02 PROCEDURE — 99245 OFF/OP CONSLTJ NEW/EST HI 55: CPT

## 2018-10-05 ENCOUNTER — OUTPATIENT (OUTPATIENT)
Dept: OUTPATIENT SERVICES | Facility: HOSPITAL | Age: 43
LOS: 1 days | End: 2018-10-05

## 2018-10-05 VITALS
TEMPERATURE: 98 F | OXYGEN SATURATION: 100 % | SYSTOLIC BLOOD PRESSURE: 128 MMHG | RESPIRATION RATE: 15 BRPM | WEIGHT: 210.1 LBS | HEART RATE: 78 BPM | HEIGHT: 67 IN | DIASTOLIC BLOOD PRESSURE: 74 MMHG

## 2018-10-05 DIAGNOSIS — N94.9 UNSPECIFIED CONDITION ASSOCIATED WITH FEMALE GENITAL ORGANS AND MENSTRUAL CYCLE: ICD-10-CM

## 2018-10-05 DIAGNOSIS — Z87.59 PERSONAL HISTORY OF OTHER COMPLICATIONS OF PREGNANCY, CHILDBIRTH AND THE PUERPERIUM: Chronic | ICD-10-CM

## 2018-10-05 DIAGNOSIS — Z98.890 OTHER SPECIFIED POSTPROCEDURAL STATES: Chronic | ICD-10-CM

## 2018-10-05 LAB
BLD GP AB SCN SERPL QL: NEGATIVE — SIGNIFICANT CHANGE UP
BUN SERPL-MCNC: 10 MG/DL — SIGNIFICANT CHANGE UP (ref 7–23)
CALCIUM SERPL-MCNC: 9.4 MG/DL — SIGNIFICANT CHANGE UP (ref 8.4–10.5)
CHLORIDE SERPL-SCNC: 103 MMOL/L — SIGNIFICANT CHANGE UP (ref 98–107)
CO2 SERPL-SCNC: 24 MMOL/L — SIGNIFICANT CHANGE UP (ref 22–31)
CREAT SERPL-MCNC: 0.69 MG/DL — SIGNIFICANT CHANGE UP (ref 0.5–1.3)
GLUCOSE SERPL-MCNC: 83 MG/DL — SIGNIFICANT CHANGE UP (ref 70–99)
HBA1C BLD-MCNC: 5.2 % — SIGNIFICANT CHANGE UP (ref 4–5.6)
HCT VFR BLD CALC: 38.8 % — SIGNIFICANT CHANGE UP (ref 34.5–45)
HGB BLD-MCNC: 12.8 G/DL — SIGNIFICANT CHANGE UP (ref 11.5–15.5)
MCHC RBC-ENTMCNC: 30 PG — SIGNIFICANT CHANGE UP (ref 27–34)
MCHC RBC-ENTMCNC: 33 % — SIGNIFICANT CHANGE UP (ref 32–36)
MCV RBC AUTO: 91.1 FL — SIGNIFICANT CHANGE UP (ref 80–100)
NRBC # FLD: 0 — SIGNIFICANT CHANGE UP
PLATELET # BLD AUTO: 308 K/UL — SIGNIFICANT CHANGE UP (ref 150–400)
PMV BLD: 10.9 FL — SIGNIFICANT CHANGE UP (ref 7–13)
POTASSIUM SERPL-MCNC: 3.1 MMOL/L — LOW (ref 3.5–5.3)
POTASSIUM SERPL-SCNC: 3.1 MMOL/L — LOW (ref 3.5–5.3)
RBC # BLD: 4.26 M/UL — SIGNIFICANT CHANGE UP (ref 3.8–5.2)
RBC # FLD: 12.3 % — SIGNIFICANT CHANGE UP (ref 10.3–14.5)
RH IG SCN BLD-IMP: POSITIVE — SIGNIFICANT CHANGE UP
SODIUM SERPL-SCNC: 140 MMOL/L — SIGNIFICANT CHANGE UP (ref 135–145)
WBC # BLD: 9.76 K/UL — SIGNIFICANT CHANGE UP (ref 3.8–10.5)
WBC # FLD AUTO: 9.76 K/UL — SIGNIFICANT CHANGE UP (ref 3.8–10.5)

## 2018-10-05 RX ORDER — SODIUM CHLORIDE 9 MG/ML
1000 INJECTION, SOLUTION INTRAVENOUS
Qty: 0 | Refills: 0 | Status: DISCONTINUED | OUTPATIENT
Start: 2018-10-15 | End: 2018-10-16

## 2018-10-05 RX ORDER — SODIUM CHLORIDE 9 MG/ML
3 INJECTION INTRAMUSCULAR; INTRAVENOUS; SUBCUTANEOUS EVERY 8 HOURS
Qty: 0 | Refills: 0 | Status: DISCONTINUED | OUTPATIENT
Start: 2018-10-15 | End: 2018-10-18

## 2018-10-05 NOTE — H&P PST ADULT - LYMPHATIC
supraclavicular R/anterior cervical R/posterior cervical L/anterior cervical L/posterior cervical R/supraclavicular L

## 2018-10-05 NOTE — H&P PST ADULT - NEGATIVE GENERAL GENITOURINARY SYMPTOMS
no hematuria/no flank pain L/normal urinary frequency/no flank pain R/no incontinence/no urine discoloration/no bladder infections/no dysuria

## 2018-10-05 NOTE — H&P PST ADULT - PROBLEM SELECTOR PLAN 1
Scheduled for Exploratory, Resection of Pelvic Mass Including Right Ovary and Fallopian Tube, Possible Hysterectomy and Staging on 10/15/2018  Preop instructions given, pt verbalized understanding   Chlorhexidine wash and GI prophylaxis provided   Labs pending

## 2018-10-05 NOTE — H&P PST ADULT - NEGATIVE CARDIOVASCULAR SYMPTOMS
no palpitations/no chest pain/no orthopnea/no paroxysmal nocturnal dyspnea/no claudication/no peripheral edema/no dyspnea on exertion

## 2018-10-05 NOTE — H&P PST ADULT - NEGATIVE GENERAL SYMPTOMS
no chills/no sweating/no malaise/no polyphagia/no fatigue/no anorexia/no weight gain/no weight loss/no fever/no polyuria/no polydipsia

## 2018-10-05 NOTE — H&P PST ADULT - RS GEN PE MLT RESP DETAILS PC
good air movement/no chest wall tenderness/airway patent/breath sounds equal/clear to auscultation bilaterally/respirations non-labored

## 2018-10-05 NOTE — H&P PST ADULT - HISTORY OF PRESENT ILLNESS
43 y/o female  with PMH of Ovarian Cyst s/p left ovarian cystectomy in 2017 presents to PST for preoperative evaluation with dx of unspecified condition associated with female genital organs and menstrual cycle. Pt presented to he GYN 3 weeks ago reporting severe pelvic pain that radiated to her back. Transvaginal ultrasound performed, large right ovarian cyst noted. Pt reports  level was also elevated. Scheduled for Exploratory, Resection of Pelvic Mass Including Right Ovary and Fallopian Tube, Possible Hysterectomy and Staging on 10/15/2018. She reports occasional pelvic pressure at present but denies abnormal vaginal bleeding or spotting. 43 y/o female  with PMH of Ovarian Cyst s/p left ovarian cystectomy in 2017 presents to PST for preoperative evaluation with dx of unspecified condition associated with female genital organs and menstrual cycle. Pt presented to he GYN 3 weeks ago reporting severe pelvic pain that radiated to her back. Transvaginal ultrasound performed, large right ovarian cyst noted. Pt reports  level was also elevated. Scheduled for Exploratory, Resection of Pelvic Mass Including Right Ovary and Fallopian Tube, Possible Hysterectomy and Staging on 10/15/2018. Pt reports occasional pelvic pressure at present but denies abnormal vaginal bleeding or spotting.

## 2018-10-08 PROBLEM — N83.201 UNSPECIFIED OVARIAN CYST, RIGHT SIDE: Chronic | Status: ACTIVE | Noted: 2018-10-05

## 2018-10-08 PROBLEM — N94.9 UNSPECIFIED CONDITION ASSOCIATED WITH FEMALE GENITAL ORGANS AND MENSTRUAL CYCLE: Chronic | Status: ACTIVE | Noted: 2018-10-05

## 2018-10-09 ENCOUNTER — RESULT REVIEW (OUTPATIENT)
Age: 43
End: 2018-10-09

## 2018-10-09 DIAGNOSIS — E87.6 HYPOKALEMIA: ICD-10-CM

## 2018-10-12 NOTE — ASU PATIENT PROFILE, ADULT - PMH
Left ovarian cyst    Right ovarian cyst    Unspecified condition associated with female genital organs and menstrual cycle

## 2018-10-14 ENCOUNTER — TRANSCRIPTION ENCOUNTER (OUTPATIENT)
Age: 43
End: 2018-10-14

## 2018-10-15 ENCOUNTER — INPATIENT (INPATIENT)
Facility: HOSPITAL | Age: 43
LOS: 2 days | Discharge: HOME | End: 2018-10-18
Attending: OBSTETRICS & GYNECOLOGY | Admitting: OBSTETRICS & GYNECOLOGY
Payer: MEDICAID

## 2018-10-15 ENCOUNTER — RESULT REVIEW (OUTPATIENT)
Age: 43
End: 2018-10-15

## 2018-10-15 ENCOUNTER — APPOINTMENT (OUTPATIENT)
Dept: GYNECOLOGIC ONCOLOGY | Facility: HOSPITAL | Age: 43
End: 2018-10-15

## 2018-10-15 VITALS
SYSTOLIC BLOOD PRESSURE: 143 MMHG | WEIGHT: 210.1 LBS | DIASTOLIC BLOOD PRESSURE: 84 MMHG | RESPIRATION RATE: 16 BRPM | HEIGHT: 67 IN | HEART RATE: 87 BPM | OXYGEN SATURATION: 100 % | TEMPERATURE: 98 F

## 2018-10-15 DIAGNOSIS — Z98.890 OTHER SPECIFIED POSTPROCEDURAL STATES: Chronic | ICD-10-CM

## 2018-10-15 DIAGNOSIS — N94.9 UNSPECIFIED CONDITION ASSOCIATED WITH FEMALE GENITAL ORGANS AND MENSTRUAL CYCLE: ICD-10-CM

## 2018-10-15 DIAGNOSIS — Z87.59 PERSONAL HISTORY OF OTHER COMPLICATIONS OF PREGNANCY, CHILDBIRTH AND THE PUERPERIUM: Chronic | ICD-10-CM

## 2018-10-15 LAB
GLUCOSE BLDV-MCNC: 109 — HIGH (ref 70–99)
HCG UR QL: NEGATIVE — SIGNIFICANT CHANGE UP
POTASSIUM BLDV-SCNC: 3.4 MMOL/L — SIGNIFICANT CHANGE UP (ref 3.4–4.5)
RH IG SCN BLD-IMP: POSITIVE — SIGNIFICANT CHANGE UP

## 2018-10-15 PROCEDURE — 88302 TISSUE EXAM BY PATHOLOGIST: CPT | Mod: 26

## 2018-10-15 PROCEDURE — 58720 REMOVAL OF OVARY/TUBE(S): CPT | Mod: GC

## 2018-10-15 PROCEDURE — 88112 CYTOPATH CELL ENHANCE TECH: CPT | Mod: 26

## 2018-10-15 PROCEDURE — 88307 TISSUE EXAM BY PATHOLOGIST: CPT | Mod: 26

## 2018-10-15 PROCEDURE — 88305 TISSUE EXAM BY PATHOLOGIST: CPT | Mod: 26

## 2018-10-15 RX ORDER — SODIUM CHLORIDE 9 MG/ML
1000 INJECTION, SOLUTION INTRAVENOUS
Qty: 0 | Refills: 0 | Status: DISCONTINUED | OUTPATIENT
Start: 2018-10-15 | End: 2018-10-16

## 2018-10-15 RX ORDER — ENOXAPARIN SODIUM 100 MG/ML
40 INJECTION SUBCUTANEOUS DAILY
Qty: 0 | Refills: 0 | Status: DISCONTINUED | OUTPATIENT
Start: 2018-10-16 | End: 2018-10-18

## 2018-10-15 RX ORDER — HEPARIN SODIUM 5000 [USP'U]/ML
5000 INJECTION INTRAVENOUS; SUBCUTANEOUS ONCE
Qty: 0 | Refills: 0 | Status: COMPLETED | OUTPATIENT
Start: 2018-10-15 | End: 2018-10-15

## 2018-10-15 RX ORDER — HYDROMORPHONE HYDROCHLORIDE 2 MG/ML
30 INJECTION INTRAMUSCULAR; INTRAVENOUS; SUBCUTANEOUS
Qty: 0 | Refills: 0 | Status: DISCONTINUED | OUTPATIENT
Start: 2018-10-15 | End: 2018-10-15

## 2018-10-15 RX ORDER — HEPARIN SODIUM 5000 [USP'U]/ML
5000 INJECTION INTRAVENOUS; SUBCUTANEOUS EVERY 8 HOURS
Qty: 0 | Refills: 0 | Status: DISCONTINUED | OUTPATIENT
Start: 2018-10-15 | End: 2018-10-16

## 2018-10-15 RX ORDER — HYDROMORPHONE HYDROCHLORIDE 2 MG/ML
30 INJECTION INTRAMUSCULAR; INTRAVENOUS; SUBCUTANEOUS
Qty: 0 | Refills: 0 | Status: DISCONTINUED | OUTPATIENT
Start: 2018-10-15 | End: 2018-10-16

## 2018-10-15 RX ORDER — NALOXONE HYDROCHLORIDE 4 MG/.1ML
0.1 SPRAY NASAL
Qty: 0 | Refills: 0 | Status: DISCONTINUED | OUTPATIENT
Start: 2018-10-15 | End: 2018-10-16

## 2018-10-15 RX ORDER — ONDANSETRON 8 MG/1
4 TABLET, FILM COATED ORAL EVERY 6 HOURS
Qty: 0 | Refills: 0 | Status: DISCONTINUED | OUTPATIENT
Start: 2018-10-15 | End: 2018-10-16

## 2018-10-15 RX ORDER — HEPARIN SODIUM 5000 [USP'U]/ML
5000 INJECTION INTRAVENOUS; SUBCUTANEOUS EVERY 8 HOURS
Qty: 0 | Refills: 0 | Status: DISCONTINUED | OUTPATIENT
Start: 2018-10-15 | End: 2018-10-15

## 2018-10-15 RX ORDER — DOCUSATE SODIUM 100 MG
100 CAPSULE ORAL
Qty: 0 | Refills: 0 | Status: DISCONTINUED | OUTPATIENT
Start: 2018-10-15 | End: 2018-10-18

## 2018-10-15 RX ADMIN — HYDROMORPHONE HYDROCHLORIDE 30 MILLILITER(S): 2 INJECTION INTRAMUSCULAR; INTRAVENOUS; SUBCUTANEOUS at 19:30

## 2018-10-15 RX ADMIN — SODIUM CHLORIDE 3 MILLILITER(S): 9 INJECTION INTRAMUSCULAR; INTRAVENOUS; SUBCUTANEOUS at 15:09

## 2018-10-15 RX ADMIN — HYDROMORPHONE HYDROCHLORIDE 30 MILLILITER(S): 2 INJECTION INTRAMUSCULAR; INTRAVENOUS; SUBCUTANEOUS at 15:08

## 2018-10-15 RX ADMIN — SODIUM CHLORIDE 30 MILLILITER(S): 9 INJECTION, SOLUTION INTRAVENOUS at 09:45

## 2018-10-15 RX ADMIN — HEPARIN SODIUM 5000 UNIT(S): 5000 INJECTION INTRAVENOUS; SUBCUTANEOUS at 21:18

## 2018-10-15 RX ADMIN — HEPARIN SODIUM 5000 UNIT(S): 5000 INJECTION INTRAVENOUS; SUBCUTANEOUS at 09:45

## 2018-10-15 RX ADMIN — SODIUM CHLORIDE 3 MILLILITER(S): 9 INJECTION INTRAMUSCULAR; INTRAVENOUS; SUBCUTANEOUS at 21:18

## 2018-10-15 NOTE — PROGRESS NOTE ADULT - SUBJECTIVE AND OBJECTIVE BOX
PA GYN/ONC POST OP NOTE:     Pt resting comfortably in PACU, still sleepy, wakes up when spoken to. Pt has PCA for pain control which adequate, denies having any nausea/ vomiting.    Vital Signs Last 24 Hrs  T(C): 36.7 (15 Oct 2018 15:00), Max: 37.1 (15 Oct 2018 14:15)  T(F): 98.1 (15 Oct 2018 15:00), Max: 98.8 (15 Oct 2018 14:15)  HR: 97 (15 Oct 2018 17:00) (77 - 99)  BP: 133/75 (15 Oct 2018 17:00) (122/80 - 143/84)  BP(mean): 84 (15 Oct 2018 17:00) (84 - 94)  RR: 12 (15 Oct 2018 17:00) (10 - 16)  SpO2: 95% (15 Oct 2018 17:00) (95% - 100%)    U/O:    I&O's Detail    15 Oct 2018 07:01  -  15 Oct 2018 17:35  --------------------------------------------------------  IN:    lactated ringers.: 320 mL  Total IN: 320 mL    OUT:    Indwelling Catheter - Urethral: 150 mL  Total OUT: 150 mL    Total NET: 170 mL      PHYSICAL EXAM:  CHEST/LUNG: CTA B/L  HEART: S1S2 RRR  ABDOMEN: Soft, appropriately tender  INCISION: Dressing C/D/I, with abdominal binder in place over dressing  EXTREMITIES: N/T B/L, Pt with Venodynes on for DVT ppx      MEDICATIONS  (STANDING):  docusate sodium 100 milliGRAM(s) Oral two times a day  heparin  Injectable 5000 Unit(s) SubCutaneous every 8 hours  HYDROmorphone PCA (1 mG/mL) 30 milliLiter(s) PCA Continuous PCA Continuous  lactated ringers. 1000 milliLiter(s) (110 mL/Hr) IV Continuous <Continuous>  lactated ringers. 1000 milliLiter(s) (30 mL/Hr) IV Continuous <Continuous>  sodium chloride 0.9% lock flush 3 milliLiter(s) IV Push every 8 hours    MEDICATIONS  (PRN):  naloxone Injectable 0.1 milliGRAM(s) IV Push every 3 minutes PRN For ANY of the following changes in patient status:  A. RR LESS THAN 10 breaths per minute, B. Oxygen saturation LESS THAN 90%, C. Sedation score of 6  ondansetron Injectable 4 milliGRAM(s) IV Push every 6 hours PRN Nausea

## 2018-10-15 NOTE — BRIEF OPERATIVE NOTE - OPERATION/FINDINGS
20cm right adnexal mass. Frozen= benign (mucinous). Normal appearing uterus. Normal appearing bilateral fallopian tubes and left ovary. Upper abdominal survey wnl.

## 2018-10-15 NOTE — PROGRESS NOTE ADULT - ASSESSMENT
43Y/O S/P XLap, RSO, Tapblock  Plan  1. Patient encouraged to use Incentive Spirometer  2. Pain management; PCA  3. Regular diet as tolerated  4. IVFluids LR @110mL/hr  5. Streeter to gravity  6. Lovenox 40mg subcut daily  7. CBC, BMP, Mg & Phos in AM POD#1

## 2018-10-15 NOTE — BRIEF OPERATIVE NOTE - PROCEDURE
<<-----Click on this checkbox to enter Procedure Right salpingo-oophorectomy  10/15/2018    Active  RSHIBATA  Exploratory laparotomy  10/15/2018    Active  RSHIBATA

## 2018-10-16 DIAGNOSIS — Z98.890 OTHER SPECIFIED POSTPROCEDURAL STATES: ICD-10-CM

## 2018-10-16 LAB
BASOPHILS # BLD AUTO: 0.02 K/UL — SIGNIFICANT CHANGE UP (ref 0–0.2)
BASOPHILS NFR BLD AUTO: 0.2 % — SIGNIFICANT CHANGE UP (ref 0–2)
BUN SERPL-MCNC: 5 MG/DL — LOW (ref 7–23)
CALCIUM SERPL-MCNC: 8.5 MG/DL — SIGNIFICANT CHANGE UP (ref 8.4–10.5)
CHLORIDE SERPL-SCNC: 101 MMOL/L — SIGNIFICANT CHANGE UP (ref 98–107)
CO2 SERPL-SCNC: 25 MMOL/L — SIGNIFICANT CHANGE UP (ref 22–31)
CREAT SERPL-MCNC: 0.57 MG/DL — SIGNIFICANT CHANGE UP (ref 0.5–1.3)
EOSINOPHIL # BLD AUTO: 0.02 K/UL — SIGNIFICANT CHANGE UP (ref 0–0.5)
EOSINOPHIL NFR BLD AUTO: 0.2 % — SIGNIFICANT CHANGE UP (ref 0–6)
GLUCOSE SERPL-MCNC: 113 MG/DL — HIGH (ref 70–99)
HCT VFR BLD CALC: 37.7 % — SIGNIFICANT CHANGE UP (ref 34.5–45)
HGB BLD-MCNC: 12.6 G/DL — SIGNIFICANT CHANGE UP (ref 11.5–15.5)
IMM GRANULOCYTES # BLD AUTO: 0.05 # — SIGNIFICANT CHANGE UP
IMM GRANULOCYTES NFR BLD AUTO: 0.4 % — SIGNIFICANT CHANGE UP (ref 0–1.5)
LYMPHOCYTES # BLD AUTO: 17.9 % — SIGNIFICANT CHANGE UP (ref 13–44)
LYMPHOCYTES # BLD AUTO: 2.07 K/UL — SIGNIFICANT CHANGE UP (ref 1–3.3)
MAGNESIUM SERPL-MCNC: 1.8 MG/DL — SIGNIFICANT CHANGE UP (ref 1.6–2.6)
MCHC RBC-ENTMCNC: 30.7 PG — SIGNIFICANT CHANGE UP (ref 27–34)
MCHC RBC-ENTMCNC: 33.4 % — SIGNIFICANT CHANGE UP (ref 32–36)
MCV RBC AUTO: 92 FL — SIGNIFICANT CHANGE UP (ref 80–100)
MONOCYTES # BLD AUTO: 0.76 K/UL — SIGNIFICANT CHANGE UP (ref 0–0.9)
MONOCYTES NFR BLD AUTO: 6.6 % — SIGNIFICANT CHANGE UP (ref 2–14)
NEUTROPHILS # BLD AUTO: 8.64 K/UL — HIGH (ref 1.8–7.4)
NEUTROPHILS NFR BLD AUTO: 74.7 % — SIGNIFICANT CHANGE UP (ref 43–77)
NON-GYNECOLOGICAL CYTOLOGY STUDY: SIGNIFICANT CHANGE UP
NRBC # FLD: 0 — SIGNIFICANT CHANGE UP
PHOSPHATE SERPL-MCNC: 3.4 MG/DL — SIGNIFICANT CHANGE UP (ref 2.5–4.5)
PLATELET # BLD AUTO: 271 K/UL — SIGNIFICANT CHANGE UP (ref 150–400)
PMV BLD: 10.4 FL — SIGNIFICANT CHANGE UP (ref 7–13)
POTASSIUM SERPL-MCNC: 3.5 MMOL/L — SIGNIFICANT CHANGE UP (ref 3.5–5.3)
POTASSIUM SERPL-SCNC: 3.5 MMOL/L — SIGNIFICANT CHANGE UP (ref 3.5–5.3)
RBC # BLD: 4.1 M/UL — SIGNIFICANT CHANGE UP (ref 3.8–5.2)
RBC # FLD: 12.2 % — SIGNIFICANT CHANGE UP (ref 10.3–14.5)
SODIUM SERPL-SCNC: 137 MMOL/L — SIGNIFICANT CHANGE UP (ref 135–145)
WBC # BLD: 11.56 K/UL — HIGH (ref 3.8–10.5)
WBC # FLD AUTO: 11.56 K/UL — HIGH (ref 3.8–10.5)

## 2018-10-16 RX ORDER — MAGNESIUM OXIDE 400 MG ORAL TABLET 241.3 MG
400 TABLET ORAL ONCE
Qty: 0 | Refills: 0 | Status: COMPLETED | OUTPATIENT
Start: 2018-10-16 | End: 2018-10-16

## 2018-10-16 RX ORDER — KETOROLAC TROMETHAMINE 30 MG/ML
30 SYRINGE (ML) INJECTION EVERY 8 HOURS
Qty: 0 | Refills: 0 | Status: DISCONTINUED | OUTPATIENT
Start: 2018-10-16 | End: 2018-10-17

## 2018-10-16 RX ORDER — OXYCODONE HYDROCHLORIDE 5 MG/1
10 TABLET ORAL EVERY 6 HOURS
Qty: 0 | Refills: 0 | Status: DISCONTINUED | OUTPATIENT
Start: 2018-10-16 | End: 2018-10-18

## 2018-10-16 RX ORDER — ACETAMINOPHEN 500 MG
1000 TABLET ORAL ONCE
Qty: 0 | Refills: 0 | Status: DISCONTINUED | OUTPATIENT
Start: 2018-10-16 | End: 2018-10-18

## 2018-10-16 RX ORDER — OXYCODONE HYDROCHLORIDE 5 MG/1
5 TABLET ORAL EVERY 4 HOURS
Qty: 0 | Refills: 0 | Status: DISCONTINUED | OUTPATIENT
Start: 2018-10-16 | End: 2018-10-18

## 2018-10-16 RX ORDER — POTASSIUM CHLORIDE 20 MEQ
20 PACKET (EA) ORAL
Qty: 0 | Refills: 0 | Status: DISCONTINUED | OUTPATIENT
Start: 2018-10-16 | End: 2018-10-16

## 2018-10-16 RX ORDER — INFLUENZA VIRUS VACCINE 15; 15; 15; 15 UG/.5ML; UG/.5ML; UG/.5ML; UG/.5ML
0.5 SUSPENSION INTRAMUSCULAR ONCE
Qty: 0 | Refills: 0 | Status: COMPLETED | OUTPATIENT
Start: 2018-10-16 | End: 2018-10-18

## 2018-10-16 RX ORDER — ACETAMINOPHEN 500 MG
650 TABLET ORAL EVERY 6 HOURS
Qty: 0 | Refills: 0 | Status: DISCONTINUED | OUTPATIENT
Start: 2018-10-16 | End: 2018-10-18

## 2018-10-16 RX ORDER — POTASSIUM CHLORIDE 20 MEQ
10 PACKET (EA) ORAL
Qty: 0 | Refills: 0 | Status: COMPLETED | OUTPATIENT
Start: 2018-10-16 | End: 2018-10-16

## 2018-10-16 RX ADMIN — Medication 100 MILLIGRAM(S): at 17:34

## 2018-10-16 RX ADMIN — ONDANSETRON 4 MILLIGRAM(S): 8 TABLET, FILM COATED ORAL at 09:50

## 2018-10-16 RX ADMIN — SODIUM CHLORIDE 3 MILLILITER(S): 9 INJECTION INTRAMUSCULAR; INTRAVENOUS; SUBCUTANEOUS at 13:04

## 2018-10-16 RX ADMIN — Medication 100 MILLIGRAM(S): at 06:13

## 2018-10-16 RX ADMIN — Medication 100 MILLIEQUIVALENT(S): at 12:41

## 2018-10-16 RX ADMIN — ENOXAPARIN SODIUM 40 MILLIGRAM(S): 100 INJECTION SUBCUTANEOUS at 06:13

## 2018-10-16 RX ADMIN — MAGNESIUM OXIDE 400 MG ORAL TABLET 400 MILLIGRAM(S): 241.3 TABLET ORAL at 17:34

## 2018-10-16 RX ADMIN — Medication 100 MILLIEQUIVALENT(S): at 11:18

## 2018-10-16 RX ADMIN — SODIUM CHLORIDE 110 MILLILITER(S): 9 INJECTION, SOLUTION INTRAVENOUS at 06:13

## 2018-10-16 RX ADMIN — Medication 100 MILLIEQUIVALENT(S): at 09:59

## 2018-10-16 RX ADMIN — HYDROMORPHONE HYDROCHLORIDE 30 MILLILITER(S): 2 INJECTION INTRAMUSCULAR; INTRAVENOUS; SUBCUTANEOUS at 07:35

## 2018-10-16 RX ADMIN — SODIUM CHLORIDE 3 MILLILITER(S): 9 INJECTION INTRAMUSCULAR; INTRAVENOUS; SUBCUTANEOUS at 06:13

## 2018-10-16 RX ADMIN — Medication 30 MILLIGRAM(S): at 18:04

## 2018-10-16 RX ADMIN — SODIUM CHLORIDE 3 MILLILITER(S): 9 INJECTION INTRAMUSCULAR; INTRAVENOUS; SUBCUTANEOUS at 21:13

## 2018-10-16 RX ADMIN — Medication 30 MILLIGRAM(S): at 17:34

## 2018-10-16 NOTE — PROGRESS NOTE ADULT - ASSESSMENT
Pt is a 43 yo POD#1 s/p Ex-lap, resection of pelvic mass and Right Salpingo-Oophorectomy (Frozen= benign). No acute overnight events. Pt is HD stable.

## 2018-10-16 NOTE — CHART NOTE - NSCHARTNOTEFT_GEN_A_CORE
R2 PM ROUNDING NOTE    Pt evaluated at bedside. No acute events. Pt tolerating a reg diet. Pain well controlled with current regiment. She is voiding spontaneously. Not yet passing flatus. She plans on ambulating in the hallway after dinner    ICU Vital Signs Last 24 Hrs  T(C): 37.3 (16 Oct 2018 17:29), Max: 37.3 (16 Oct 2018 17:29)  T(F): 99.2 (16 Oct 2018 17:29), Max: 99.2 (16 Oct 2018 17:29)  HR: 95 (16 Oct 2018 17:29) (76 - 95)  BP: 111/68 (16 Oct 2018 17:29) (111/68 - 144/71)  RR: 17 (16 Oct 2018 17:29) (16 - 17)  SpO2: 100% (16 Oct 2018 17:29) (96% - 100%)      Gen: NAD  Abd: soft, appropriately tender, slight distention   Incision: c/d. dressing in place     Pt is a 43 yo POD#1 s/p Ex-lap, resection of pelvic mass and Right Salpingo-Oophorectomy (Frozen= benign). No acute overnight events. Pt is HD stable.   - C/w current management.   -Will keep standing Toradol overnight. PCA was removed today. Will transition to all po meds tomorrow    SONU Isaac, pgy2

## 2018-10-16 NOTE — PROGRESS NOTE ADULT - SUBJECTIVE AND OBJECTIVE BOX
Anesthesia Pain Management Service    SUBJECTIVE: Patient is doing well with IV PCA and no significant problems reported.    Pain Scale Score	At rest: _5__ 	With Activity: ___ 	[X ] Refer to charted pain scores    THERAPY:    [ ] IV PCA Morphine		[ ] 5 mg/mL	[ ] 1 mg/mL  [X ] IV PCA Hydromorphone	[ ] 5 mg/mL	[X ] 1 mg/mL  [ ] IV PCA Fentanyl		[ ] 50 micrograms/mL    Demand dose __0.2_ lockout __6_ (minutes) Continuous Rate _0__ Total: 2.5mg used (in past 24 hours)      MEDICATIONS  (STANDING):  acetaminophen  IVPB .. 1000 milliGRAM(s) IV Intermittent once  docusate sodium 100 milliGRAM(s) Oral two times a day  enoxaparin Injectable 40 milliGRAM(s) SubCutaneous daily  HYDROmorphone PCA (1 mG/mL) 30 milliLiter(s) PCA Continuous PCA Continuous  influenza   Vaccine 0.5 milliLiter(s) IntraMuscular once  lactated ringers. 1000 milliLiter(s) (110 mL/Hr) IV Continuous <Continuous>  lactated ringers. 1000 milliLiter(s) (30 mL/Hr) IV Continuous <Continuous>  magnesium oxide 400 milliGRAM(s) Oral once  potassium chloride  10 mEq/100 mL IVPB 10 milliEquivalent(s) IV Intermittent every 1 hour  sodium chloride 0.9% lock flush 3 milliLiter(s) IV Push every 8 hours    MEDICATIONS  (PRN):  naloxone Injectable 0.1 milliGRAM(s) IV Push every 3 minutes PRN For ANY of the following changes in patient status:  A. RR LESS THAN 10 breaths per minute, B. Oxygen saturation LESS THAN 90%, C. Sedation score of 6  ondansetron Injectable 4 milliGRAM(s) IV Push every 6 hours PRN Nausea      OBJECTIVE:laying in bed     Sedation Score:	[ X] Alert	[ ] Drowsy 	[ ] Arousable	[ ] Asleep	[ ] Unresponsive    Side Effects:	[X ] None	[ ] Nausea	[ ] Vomiting	[ ] Pruritus  		[ ] Other:    Vital Signs Last 24 Hrs  T(C): 36.9 (16 Oct 2018 05:57), Max: 37.1 (15 Oct 2018 14:15)  T(F): 98.4 (16 Oct 2018 05:57), Max: 98.8 (15 Oct 2018 14:15)  HR: 80 (16 Oct 2018 10:24) (77 - 99)  BP: 144/71 (16 Oct 2018 10:24) (122/80 - 144/71)  BP(mean): 87 (15 Oct 2018 17:45) (84 - 94)  RR: 16 (16 Oct 2018 10:24) (10 - 17)  SpO2: 96% (16 Oct 2018 10:24) (95% - 100%)    ASSESSMENT/ PLAN    Therapy to  be:	[ X] Continue   [ ] Discontinued   [ ] Change to prn Analgesics    Documentation and Verification of current medications:   [X] Done	[ ] Not done, not elligible    Comments: Continue current therapy Anesthesia Pain Management Service    SUBJECTIVE: Patient is doing well with IV PCA and no significant problems reported.    Pain Scale Score	At rest: _5__ 	With Activity: ___ 	[X ] Refer to charted pain scores    THERAPY:    [ ] IV PCA Morphine		[ ] 5 mg/mL	[ ] 1 mg/mL  [X ] IV PCA Hydromorphone	[ ] 5 mg/mL	[X ] 1 mg/mL  [ ] IV PCA Fentanyl		[ ] 50 micrograms/mL    Demand dose __0.2_ lockout __6_ (minutes) Continuous Rate _0__ Total: 5.2mg used (in past 24 hours)      MEDICATIONS  (STANDING):  acetaminophen  IVPB .. 1000 milliGRAM(s) IV Intermittent once  docusate sodium 100 milliGRAM(s) Oral two times a day  enoxaparin Injectable 40 milliGRAM(s) SubCutaneous daily  HYDROmorphone PCA (1 mG/mL) 30 milliLiter(s) PCA Continuous PCA Continuous  influenza   Vaccine 0.5 milliLiter(s) IntraMuscular once  lactated ringers. 1000 milliLiter(s) (110 mL/Hr) IV Continuous <Continuous>  lactated ringers. 1000 milliLiter(s) (30 mL/Hr) IV Continuous <Continuous>  magnesium oxide 400 milliGRAM(s) Oral once  potassium chloride  10 mEq/100 mL IVPB 10 milliEquivalent(s) IV Intermittent every 1 hour  sodium chloride 0.9% lock flush 3 milliLiter(s) IV Push every 8 hours    MEDICATIONS  (PRN):  naloxone Injectable 0.1 milliGRAM(s) IV Push every 3 minutes PRN For ANY of the following changes in patient status:  A. RR LESS THAN 10 breaths per minute, B. Oxygen saturation LESS THAN 90%, C. Sedation score of 6  ondansetron Injectable 4 milliGRAM(s) IV Push every 6 hours PRN Nausea      OBJECTIVE:laying in bed     Sedation Score:	[ X] Alert	[ ] Drowsy 	[ ] Arousable	[ ] Asleep	[ ] Unresponsive    Side Effects:	[X ] None	[ ] Nausea	[ ] Vomiting	[ ] Pruritus  		[ ] Other:    Vital Signs Last 24 Hrs  T(C): 36.9 (16 Oct 2018 05:57), Max: 37.1 (15 Oct 2018 14:15)  T(F): 98.4 (16 Oct 2018 05:57), Max: 98.8 (15 Oct 2018 14:15)  HR: 80 (16 Oct 2018 10:24) (77 - 99)  BP: 144/71 (16 Oct 2018 10:24) (122/80 - 144/71)  BP(mean): 87 (15 Oct 2018 17:45) (84 - 94)  RR: 16 (16 Oct 2018 10:24) (10 - 17)  SpO2: 96% (16 Oct 2018 10:24) (95% - 100%)    ASSESSMENT/ PLAN    Therapy to  be:	[ X] Continue   [ ] Discontinued   [ ] Change to prn Analgesics    Documentation and Verification of current medications:   [X] Done	[ ] Not done, not elligible    Comments: Continue current therapy

## 2018-10-16 NOTE — PROGRESS NOTE ADULT - SUBJECTIVE AND OBJECTIVE BOX
Anesthesia Pain Management Service    SUBJECTIVE: Pt doing well with IV PCA without problems reported.    Therapy:	  [ X] IV PCA	   [ ] Epidural           [ ] s/p Spinal Opoid              [ ] Postpartum infusion	  [ ] Patient controlled regional anesthesia (PCRA)    [ ] prn Analgesics    Allergies    No Known Allergies    Intolerances      MEDICATIONS  (STANDING):  acetaminophen  IVPB .. 1000 milliGRAM(s) IV Intermittent once  docusate sodium 100 milliGRAM(s) Oral two times a day  enoxaparin Injectable 40 milliGRAM(s) SubCutaneous daily  HYDROmorphone PCA (1 mG/mL) 30 milliLiter(s) PCA Continuous PCA Continuous  influenza   Vaccine 0.5 milliLiter(s) IntraMuscular once  lactated ringers. 1000 milliLiter(s) (110 mL/Hr) IV Continuous <Continuous>  lactated ringers. 1000 milliLiter(s) (30 mL/Hr) IV Continuous <Continuous>  magnesium oxide 400 milliGRAM(s) Oral once  potassium chloride  10 mEq/100 mL IVPB 10 milliEquivalent(s) IV Intermittent every 1 hour  sodium chloride 0.9% lock flush 3 milliLiter(s) IV Push every 8 hours    MEDICATIONS  (PRN):  naloxone Injectable 0.1 milliGRAM(s) IV Push every 3 minutes PRN For ANY of the following changes in patient status:  A. RR LESS THAN 10 breaths per minute, B. Oxygen saturation LESS THAN 90%, C. Sedation score of 6  ondansetron Injectable 4 milliGRAM(s) IV Push every 6 hours PRN Nausea      OBJECTIVE:   [X] No new signs     [ ] Other:    Side Effects:  [X ] None			[ ] Other:    Assessment of Catheter Site:		[ ] Intact		[ ] Other:    ASSESSMENT/PLAN  [ X] Continue current therapy    [ ] Therapy changed to:    [ ] IV PCA       [ ] Epidural     [ ] prn Analgesics     Comments:    Progress Note written now but Patient was seen earlier.

## 2018-10-16 NOTE — PROGRESS NOTE ADULT - PROBLEM SELECTOR PLAN 1
Neuro: Will transition from PCA to po pain meds. Will give IV Tylenol for now.   CV: VSS, CBC in AM  Pulm: Saturating well on room air, encourage incentive spirometry  GI: Reg diet  : UOP adequate, will d/c frank after AM labs result  Heme: Lovenoz and SCDs for DVT ppx.   Dispo: Continue routine post-op care    SONU Isaac, pgy2 Neuro: Will transition from PCA to po pain meds. Will give IV Tylenol for now.   CV: VSS, H/H stable  Pulm: Saturating well on room air, encourage incentive spirometry  GI: Reg diet  : UOP adequate, will d/c frank   Heme: Lovenox and SCDs for DVT ppx.   Dispo: Continue routine post-op care    SONU Isaac, pgy2

## 2018-10-16 NOTE — PROGRESS NOTE ADULT - SUBJECTIVE AND OBJECTIVE BOX
POD#1   HD#2    Patient seen and examined at bedside, no acute overnight events. No acute complaints. She is having some abd pain which is improved with PCA. Pt has not yet ambulated, not yet passing flatus, frank catheter in place.  She is tolerating clears overnight.  Denies CP, SOB, N/V, fevers, and chills.    Vital Signs Last 24 Hours  T(C): 36.9 (10-16-18 @ 05:57), Max: 37.1 (10-15-18 @ 14:15)  HR: 80 (10-16-18 @ 05:57) (77 - 99)  BP: 135/75 (10-16-18 @ 05:57) (122/80 - 143/84)  RR: 16 (10-16-18 @ 05:57) (10 - 17)  SpO2: 99% (10-16-18 @ 05:57) (95% - 100%)    I&O's Summary    15 Oct 2018 07:01  -  16 Oct 2018 06:39  --------------------------------------------------------  IN: 1750 mL / OUT: 875 mL / NET: 875 mL        Physical Exam:  General: NAD  CV: NR, RR, S1, S2, no M/R/G  Lungs: CTA-B  Abdomen: Soft, appropriately tender, non-distended, +BS  Incision: vertical C/D. Bandage in place. Abd binder in place.   Ext: No pain or swelling    Labs:      Blood Type: A Positive      MEDICATIONS  (STANDING):  acetaminophen  IVPB .. 1000 milliGRAM(s) IV Intermittent once  docusate sodium 100 milliGRAM(s) Oral two times a day  enoxaparin Injectable 40 milliGRAM(s) SubCutaneous daily  HYDROmorphone PCA (1 mG/mL) 30 milliLiter(s) PCA Continuous PCA Continuous  influenza   Vaccine 0.5 milliLiter(s) IntraMuscular once  lactated ringers. 1000 milliLiter(s) (110 mL/Hr) IV Continuous <Continuous>  lactated ringers. 1000 milliLiter(s) (30 mL/Hr) IV Continuous <Continuous>  sodium chloride 0.9% lock flush 3 milliLiter(s) IV Push every 8 hours    MEDICATIONS  (PRN):  naloxone Injectable 0.1 milliGRAM(s) IV Push every 3 minutes PRN For ANY of the following changes in patient status:  A. RR LESS THAN 10 breaths per minute, B. Oxygen saturation LESS THAN 90%, C. Sedation score of 6  ondansetron Injectable 4 milliGRAM(s) IV Push every 6 hours PRN Nausea

## 2018-10-16 NOTE — PROGRESS NOTE ADULT - SUBJECTIVE AND OBJECTIVE BOX
ANESTHESIA POSTOP CHECK    42y Female POSTOP DAY 1 S/P     Vital Signs Last 24 Hrs  T(C): 36.9 (16 Oct 2018 05:57), Max: 37.1 (15 Oct 2018 14:15)  T(F): 98.4 (16 Oct 2018 05:57), Max: 98.8 (15 Oct 2018 14:15)  HR: 80 (16 Oct 2018 10:24) (77 - 99)  BP: 144/71 (16 Oct 2018 10:24) (122/80 - 144/71)  BP(mean): 87 (15 Oct 2018 17:45) (84 - 94)  RR: 16 (16 Oct 2018 10:24) (10 - 17)  SpO2: 96% (16 Oct 2018 10:24) (95% - 100%)  I&O's Summary    15 Oct 2018 07:01  -  16 Oct 2018 07:00  --------------------------------------------------------  IN: 1750 mL / OUT: 1375 mL / NET: 375 mL    16 Oct 2018 07:01  -  16 Oct 2018 11:42  --------------------------------------------------------  IN: 0 mL / OUT: 200 mL / NET: -200 mL        [X ] NO APPARENT ANESTHESIA COMPLICATIONS      Comments:

## 2018-10-17 ENCOUNTER — TRANSCRIPTION ENCOUNTER (OUTPATIENT)
Age: 43
End: 2018-10-17

## 2018-10-17 RX ORDER — DOCUSATE SODIUM 100 MG
1 CAPSULE ORAL
Qty: 60 | Refills: 0 | OUTPATIENT
Start: 2018-10-17 | End: 2018-11-15

## 2018-10-17 RX ORDER — SIMETHICONE 80 MG/1
80 TABLET, CHEWABLE ORAL EVERY 6 HOURS
Qty: 0 | Refills: 0 | Status: DISCONTINUED | OUTPATIENT
Start: 2018-10-17 | End: 2018-10-18

## 2018-10-17 RX ADMIN — Medication 500 MILLIGRAM(S): at 17:41

## 2018-10-17 RX ADMIN — SODIUM CHLORIDE 3 MILLILITER(S): 9 INJECTION INTRAMUSCULAR; INTRAVENOUS; SUBCUTANEOUS at 05:20

## 2018-10-17 RX ADMIN — ENOXAPARIN SODIUM 40 MILLIGRAM(S): 100 INJECTION SUBCUTANEOUS at 11:49

## 2018-10-17 RX ADMIN — Medication 30 MILLIGRAM(S): at 02:01

## 2018-10-17 RX ADMIN — SODIUM CHLORIDE 3 MILLILITER(S): 9 INJECTION INTRAMUSCULAR; INTRAVENOUS; SUBCUTANEOUS at 13:24

## 2018-10-17 RX ADMIN — Medication 100 MILLIGRAM(S): at 05:24

## 2018-10-17 RX ADMIN — SIMETHICONE 80 MILLIGRAM(S): 80 TABLET, CHEWABLE ORAL at 17:41

## 2018-10-17 RX ADMIN — OXYCODONE HYDROCHLORIDE 5 MILLIGRAM(S): 5 TABLET ORAL at 06:11

## 2018-10-17 RX ADMIN — Medication 100 MILLIGRAM(S): at 17:41

## 2018-10-17 RX ADMIN — SIMETHICONE 80 MILLIGRAM(S): 80 TABLET, CHEWABLE ORAL at 11:49

## 2018-10-17 RX ADMIN — Medication 500 MILLIGRAM(S): at 18:30

## 2018-10-17 RX ADMIN — OXYCODONE HYDROCHLORIDE 5 MILLIGRAM(S): 5 TABLET ORAL at 05:39

## 2018-10-17 RX ADMIN — SODIUM CHLORIDE 3 MILLILITER(S): 9 INJECTION INTRAMUSCULAR; INTRAVENOUS; SUBCUTANEOUS at 21:38

## 2018-10-17 NOTE — PROGRESS NOTE ADULT - ASSESSMENT
Pt is a 41 yo POD#2 s/p Ex-lap, resection of pelvic mass and Right Salpingo-Oophorectomy (Frozen= benign). No acute overnight events. Pt is HD stable.

## 2018-10-17 NOTE — DISCHARGE NOTE ADULT - MEDICATION SUMMARY - MEDICATIONS TO TAKE
I will START or STAY ON the medications listed below when I get home from the hospital:    naproxen 500 mg oral tablet  -- 1 tab(s) by mouth every 12 hours   -- Check with your doctor before becoming pregnant.  May cause drowsiness or dizziness.  Obtain medical advice before taking any non-prescription drugs as some may affect the action of this medication.  Take with food or milk.    -- Indication: For Pain    Percocet 5/325 oral tablet  -- 1 tab(s) by mouth every 6 hours, As Needed -for severe pain MDD:4 tablets  -- Caution federal law prohibits the transfer of this drug to any person other  than the person for whom it was prescribed.  May cause drowsiness.  Alcohol may intensify this effect.  Use care when operating dangerous machinery.  This prescription cannot be refilled.  This product contains acetaminophen.  Do not use  with any other product containing acetaminophen to prevent possible liver damage.  Using more of this medication than prescribed may cause serious breathing problems.    -- Indication: For severe pain    Colace 100 mg oral capsule  -- 1 cap(s) by mouth 2 times a day, As Needed -for constipation   -- Medication should be taken with plenty of water.    -- Indication: For Constipation

## 2018-10-17 NOTE — PROGRESS NOTE ADULT - SUBJECTIVE AND OBJECTIVE BOX
POD#1   HD#2    Patient seen and examined at bedside, no acute overnight events. No acute complaints, pain well controlled.   Patient is ambulating, not yet passing flatus, voiding spontaneously, and tolerating regular diet for dinner.  Denies CP, SOB, N/V, fevers, and chills.    Vital Signs Last 24 Hours  T(C): 36.9 (10-17-18 @ 05:23), Max: 37.6 (10-17-18 @ 01:35)  HR: 93 (10-17-18 @ 05:23) (76 - 96)  BP: 108/54 (10-17-18 @ 05:23) (104/63 - 144/71)  RR: 18 (10-17-18 @ 05:23) (16 - 18)  SpO2: 96% (10-17-18 @ 05:23) (96% - 100%)    I&O's Summary    15 Oct 2018 07:01  -  16 Oct 2018 07:00  --------------------------------------------------------  IN: 1750 mL / OUT: 1375 mL / NET: 375 mL    16 Oct 2018 07:01  -  17 Oct 2018 06:57  --------------------------------------------------------  IN: 0 mL / OUT: 1900 mL / NET: -1900 mL        Physical Exam:  General: NAD  CV: NR, RR, S1, S2, no M/R/G  Lungs: CTA-B  Abdomen: Soft, non-tender, mild distention, hypoactive BS.  Incision: vertical incision CDI. Abd binder in place.  Ext: No pain or swelling    Labs:                        12.6   11.56 )-----------( 271      ( 16 Oct 2018 06:29 )             37.7   baso 0.2    eos 0.2    imm gran 0.4    lymph 17.9   mono 6.6    poly 74.7     10-16    137  |  101  |  5<L>  ----------------------------<  113<H>  3.5   |  25  |  0.57    Ca    8.5      16 Oct 2018 06:29  Phos  3.4     10-16  Mg     1.8     10-16            Blood Type: A Positive      MEDICATIONS  (STANDING):  acetaminophen  IVPB .. 1000 milliGRAM(s) IV Intermittent once  docusate sodium 100 milliGRAM(s) Oral two times a day  enoxaparin Injectable 40 milliGRAM(s) SubCutaneous daily  influenza   Vaccine 0.5 milliLiter(s) IntraMuscular once  ketorolac   Injectable 30 milliGRAM(s) IV Push every 8 hours  sodium chloride 0.9% lock flush 3 milliLiter(s) IV Push every 8 hours    MEDICATIONS  (PRN):  acetaminophen   Tablet .. 650 milliGRAM(s) Oral every 6 hours PRN Mild Pain (1 - 3)  oxyCODONE    IR 5 milliGRAM(s) Oral every 4 hours PRN Moderate Pain (4 - 6)  oxyCODONE    IR 10 milliGRAM(s) Oral every 6 hours PRN Severe Pain (7 - 10)

## 2018-10-17 NOTE — DISCHARGE NOTE ADULT - PATIENT PORTAL LINK FT
You can access the [email or web address] Patient Portal, offered by [facility], by registering with the following website: [email or web address] [facility]

## 2018-10-17 NOTE — DISCHARGE NOTE ADULT - ADDITIONAL INSTRUCTIONS
f/u with Dr. Richardson on 10/30 at 11am  Keep incision dry. Leave steri-strips in place until they start to fall off.

## 2018-10-17 NOTE — CHART NOTE - NSCHARTNOTEFT_GEN_A_CORE
R2 GYN ONC PM ROUNDING NOTE    S:   Patient seen and examined at bedside. No acute complaints, pain well controlled.  Patient is ambulating, passing flatus, voiding spontaneously, and tolerating regular diet. Denies CP, SOB, N/V, fevers, and chills.    O:   Vital Signs Last 24 Hours  T(C): 37.3 (10-17-18 @ 17:48), Max: 37.7 (10-17-18 @ 14:21)  HR: 99 (10-17-18 @ 17:48) (93 - 99)  BP: 124/69 (10-17-18 @ 17:48) (104/63 - 130/77)  RR: 18 (10-17-18 @ 17:48) (17 - 18)  SpO2: 100% (10-17-18 @ 17:48) (96% - 100%)    I&O's Summary    16 Oct 2018 07:01  -  17 Oct 2018 07:00  --------------------------------------------------------  IN: 0 mL / OUT: 1900 mL / NET: -1900 mL    17 Oct 2018 07:01  -  17 Oct 2018 18:55  --------------------------------------------------------  IN: 0 mL / OUT: 700 mL / NET: -700 mL    Physical Exam:  General: NAD  Abdomen: soft, nontender, nondistended    Labs:                        12.6   11.56 )-----------( 271      ( 16 Oct 2018 06:29 )             37.7   baso 0.2    eos 0.2    imm gran 0.4    lymph 17.9   mono 6.6    poly 74.7     MEDICATIONS  (STANDING):  acetaminophen  IVPB .. 1000 milliGRAM(s) IV Intermittent once  docusate sodium 100 milliGRAM(s) Oral two times a day  enoxaparin Injectable 40 milliGRAM(s) SubCutaneous daily  influenza   Vaccine 0.5 milliLiter(s) IntraMuscular once  naproxen 500 milliGRAM(s) Oral every 12 hours  simethicone 80 milliGRAM(s) Chew every 6 hours  sodium chloride 0.9% lock flush 3 milliLiter(s) IV Push every 8 hours    MEDICATIONS  (PRN):  acetaminophen   Tablet .. 650 milliGRAM(s) Oral every 6 hours PRN Mild Pain (1 - 3)  oxyCODONE    IR 5 milliGRAM(s) Oral every 4 hours PRN Moderate Pain (4 - 6)  oxyCODONE    IR 10 milliGRAM(s) Oral every 6 hours PRN Severe Pain (7 - 10)    A/P: Pt is a 41 yo POD#2 s/p Ex-lap, resection of pelvic mass and Right Salpingo-Oophorectomy (Frozen= benign). Patient is stable and progressing normally postoperatively.     - continue routine postop care    FELICIA Fountain MD PGY2

## 2018-10-17 NOTE — DISCHARGE NOTE ADULT - PLAN OF CARE
Recovery Regular diet as tolerated, regular activity as tolerated, no heavy lifting for first two weeks.  Nothing per vagina: no intercourse, tampons or douching.  Call your provider if you experience fevers, chills, worsening abdominal pain, inability to urinate or worsening vaginal bleeding.

## 2018-10-17 NOTE — DISCHARGE NOTE ADULT - CARE PROVIDER_API CALL
Margarita Richardson), Gynecologic Oncology; Obstetrics and Gynecology  [facility]  [address] [place] [address]  Phone: [phone] option 2  Fax: [phone]

## 2018-10-17 NOTE — DISCHARGE NOTE ADULT - HOSPITAL COURSE
Patient had uncomplicated ex-lap, resection of pelvic mass, RSO.  Please see operative note for details.  During postoperative course patient's vitals were stable, vaginal bleeding appropriate, and pain well controlled.  Post operation day one hematocrit was 37.7.  On day of discharge patient was ambulating, her pain controlled with oral medications, had adequate oral intake, and was voiding freely.  Discharge instructions and precautions were given.  Will have close follow up with Dr. Richardson Patient had uncomplicated ex-lap, resection of pelvic mass, RSO.  Please see operative note for details.  During postoperative course patient's vitals were stable, vaginal bleeding appropriate, and pain well controlled.  Post operation day one hematocrit was 37.7.  On day of discharge patient (POD#3)was ambulating, her pain controlled with oral medications, had adequate oral intake, and was voiding freely.  Discharge instructions and precautions were given.  Will have close follow up with Dr. Richardson Patient had uncomplicated ex-lap, resection of pelvic mass, RSO.  Please see operative note for details.  During postoperative course patient's vitals were stable, and pain well controlled.  Post operation day one hematocrit was 37.7.  On day of discharge patient (POD#3)was ambulating, her pain controlled with oral medications, had adequate oral intake, and was voiding freely.  Discharge instructions and precautions were given.  Will have close follow up with Dr. Richardson

## 2018-10-17 NOTE — DISCHARGE NOTE ADULT - CARE PLAN
Principal Discharge DX:	Post-operative state  Goal:	Recovery  Assessment and plan of treatment:	Regular diet as tolerated, regular activity as tolerated, no heavy lifting for first two weeks.  Nothing per vagina: no intercourse, tampons or douching.  Call your provider if you experience fevers, chills, worsening abdominal pain, inability to urinate or worsening vaginal bleeding.

## 2018-10-17 NOTE — PROGRESS NOTE ADULT - PROBLEM SELECTOR PLAN 1
Neuro: Will transition from IV toradol to naprosyn. C/w oxycodone  CV: VSS, no AM labs  Pulm: Saturating well on room air, encourage incentive spirometry  GI: Reg diet  : UOP adequate  Heme: Lovenox and SCDs for DVT ppx.   Dispo: Continue routine post-op care    SONU Isaac, pgy2

## 2018-10-18 VITALS
SYSTOLIC BLOOD PRESSURE: 110 MMHG | RESPIRATION RATE: 18 BRPM | OXYGEN SATURATION: 100 % | TEMPERATURE: 98 F | HEART RATE: 84 BPM | DIASTOLIC BLOOD PRESSURE: 62 MMHG

## 2018-10-18 RX ADMIN — Medication 500 MILLIGRAM(S): at 05:03

## 2018-10-18 RX ADMIN — SIMETHICONE 80 MILLIGRAM(S): 80 TABLET, CHEWABLE ORAL at 05:03

## 2018-10-18 RX ADMIN — ENOXAPARIN SODIUM 40 MILLIGRAM(S): 100 INJECTION SUBCUTANEOUS at 12:04

## 2018-10-18 RX ADMIN — SIMETHICONE 80 MILLIGRAM(S): 80 TABLET, CHEWABLE ORAL at 12:05

## 2018-10-18 RX ADMIN — SODIUM CHLORIDE 3 MILLILITER(S): 9 INJECTION INTRAMUSCULAR; INTRAVENOUS; SUBCUTANEOUS at 05:03

## 2018-10-18 RX ADMIN — SIMETHICONE 80 MILLIGRAM(S): 80 TABLET, CHEWABLE ORAL at 00:22

## 2018-10-18 RX ADMIN — Medication 100 MILLIGRAM(S): at 05:03

## 2018-10-18 RX ADMIN — INFLUENZA VIRUS VACCINE 0.5 MILLILITER(S): 15; 15; 15; 15 SUSPENSION INTRAMUSCULAR at 12:05

## 2018-10-18 NOTE — PROGRESS NOTE ADULT - ATTENDING COMMENTS
Patient seen and examined.   Doing well.   Stable to DC home.   Instructions reviewed.
Seen on rounds with team. Agree with eval and plan.
Pt seen and examined, agree with gyn housestaff  POD#1  Routine postop care  Advance diet as tolerated

## 2018-10-18 NOTE — PROGRESS NOTE ADULT - SUBJECTIVE AND OBJECTIVE BOX
POD# 3  HD#4    Patient seen and examined at bedside, no acute overnight events. No acute complaints, pain well controlled.  Patient is ambulating, passing flatus, voiding spontaneously, and tolerating regular diet. She had a small BM yesterday. Denies CP, SOB, N/V, fevers, and chills.    Vital Signs Last 24 Hours  T(C): 36.7 (10-18-18 @ 05:02), Max: 37.7 (10-17-18 @ 14:21)  HR: 81 (10-18-18 @ 05:02) (81 - 100)  BP: 122/69 (10-18-18 @ 05:02) (113/61 - 130/77)  RR: 17 (10-18-18 @ 05:02) (17 - 18)  SpO2: 98% (10-18-18 @ 05:02) (98% - 100%)    I&O's Summary    16 Oct 2018 07:01  -  17 Oct 2018 07:00  --------------------------------------------------------  IN: 0 mL / OUT: 1900 mL / NET: -1900 mL    17 Oct 2018 07:01  -  18 Oct 2018 06:29  --------------------------------------------------------  IN: 0 mL / OUT: 1000 mL / NET: -1000 mL        Physical Exam:  General: NAD  CV: NR, RR, S1, S2, no M/R/G  Lungs: CTA-B  Abdomen: Soft, non-tender, non-distended, +BS  Incision: vertical incision CDI  Ext: No pain or swelling    Labs:                        12.6   11.56 )-----------( 271      ( 16 Oct 2018 06:29 )             37.7   baso 0.2    eos 0.2    imm gran 0.4    lymph 17.9   mono 6.6    poly 74.7                 Blood Type: A Positive      MEDICATIONS  (STANDING):  acetaminophen  IVPB .. 1000 milliGRAM(s) IV Intermittent once  docusate sodium 100 milliGRAM(s) Oral two times a day  enoxaparin Injectable 40 milliGRAM(s) SubCutaneous daily  influenza   Vaccine 0.5 milliLiter(s) IntraMuscular once  naproxen 500 milliGRAM(s) Oral every 12 hours  simethicone 80 milliGRAM(s) Chew every 6 hours  sodium chloride 0.9% lock flush 3 milliLiter(s) IV Push every 8 hours    MEDICATIONS  (PRN):  acetaminophen   Tablet .. 650 milliGRAM(s) Oral every 6 hours PRN Mild Pain (1 - 3)  oxyCODONE    IR 5 milliGRAM(s) Oral every 4 hours PRN Moderate Pain (4 - 6)  oxyCODONE    IR 10 milliGRAM(s) Oral every 6 hours PRN Severe Pain (7 - 10)

## 2018-10-18 NOTE — PROGRESS NOTE ADULT - PROBLEM SELECTOR PLAN 1
Neuro: C/w po pain meds  CV: VSS, no AM labs  Pulm: Saturating well on room air, encourage incentive spirometry  GI: Reg diet  : UOP adequate  Heme: Lovenox and SCDs for DVT ppx.   Dispo: Continue routine post-op care. Anticipate discharge later today. To be d/w attending    SONU Isaac, pgy2

## 2018-10-18 NOTE — PROGRESS NOTE ADULT - ASSESSMENT
Pt is a 41 yo POD#3 s/p Ex-lap, resection of pelvic mass and Right Salpingo-Oophorectomy (Frozen= benign). No acute overnight events. Pt is HD stable.

## 2018-10-23 LAB — SURGICAL PATHOLOGY STUDY: SIGNIFICANT CHANGE UP

## 2018-10-30 ENCOUNTER — APPOINTMENT (OUTPATIENT)
Dept: GYNECOLOGIC ONCOLOGY | Facility: CLINIC | Age: 43
End: 2018-10-30
Payer: MEDICAID

## 2018-10-30 ENCOUNTER — TRANSCRIPTION ENCOUNTER (OUTPATIENT)
Age: 43
End: 2018-10-30

## 2018-10-30 VITALS
TEMPERATURE: 99.7 F | WEIGHT: 204 LBS | HEIGHT: 67 IN | SYSTOLIC BLOOD PRESSURE: 131 MMHG | BODY MASS INDEX: 32.02 KG/M2 | DIASTOLIC BLOOD PRESSURE: 86 MMHG | HEART RATE: 90 BPM

## 2018-10-30 PROCEDURE — 99024 POSTOP FOLLOW-UP VISIT: CPT

## 2019-01-09 ENCOUNTER — APPOINTMENT (OUTPATIENT)
Dept: OBGYN | Facility: CLINIC | Age: 44
End: 2019-01-09
Payer: MEDICAID

## 2019-01-09 VITALS — SYSTOLIC BLOOD PRESSURE: 145 MMHG | DIASTOLIC BLOOD PRESSURE: 88 MMHG

## 2019-01-09 VITALS
HEIGHT: 67 IN | SYSTOLIC BLOOD PRESSURE: 164 MMHG | WEIGHT: 206 LBS | BODY MASS INDEX: 32.33 KG/M2 | HEART RATE: 85 BPM | DIASTOLIC BLOOD PRESSURE: 98 MMHG

## 2019-01-09 PROCEDURE — 99386 PREV VISIT NEW AGE 40-64: CPT

## 2019-01-10 ENCOUNTER — OTHER (OUTPATIENT)
Age: 44
End: 2019-01-10

## 2019-01-14 LAB
CYTOLOGY CVX/VAG DOC THIN PREP: NORMAL
HPV HIGH+LOW RISK DNA PNL CVX: NOT DETECTED

## 2019-01-14 NOTE — HISTORY OF PRESENT ILLNESS
[Sexually Active] : is sexually active [Monogamous] : is monogamous [Contraception] : uses contraception [Condoms] : uses condoms

## 2019-01-31 ENCOUNTER — ASOB RESULT (OUTPATIENT)
Age: 44
End: 2019-01-31

## 2019-01-31 ENCOUNTER — APPOINTMENT (OUTPATIENT)
Dept: OBGYN | Facility: CLINIC | Age: 44
End: 2019-01-31
Payer: MEDICAID

## 2019-01-31 VITALS
SYSTOLIC BLOOD PRESSURE: 145 MMHG | WEIGHT: 206 LBS | HEART RATE: 76 BPM | HEIGHT: 67 IN | DIASTOLIC BLOOD PRESSURE: 101 MMHG | BODY MASS INDEX: 32.33 KG/M2

## 2019-01-31 VITALS — DIASTOLIC BLOOD PRESSURE: 88 MMHG | SYSTOLIC BLOOD PRESSURE: 152 MMHG

## 2019-01-31 DIAGNOSIS — Z30.430 ENCOUNTER FOR INSERTION OF INTRAUTERINE CONTRACEPTIVE DEVICE: ICD-10-CM

## 2019-01-31 LAB
HCG UR QL: NEGATIVE
QUALITY CONTROL: YES

## 2019-01-31 PROCEDURE — 76830 TRANSVAGINAL US NON-OB: CPT

## 2019-01-31 PROCEDURE — 58300 INSERT INTRAUTERINE DEVICE: CPT

## 2019-01-31 RX ADMIN — LEVONORGESTREL MCG/24HR: 52 INTRAUTERINE DEVICE INTRAUTERINE at 00:00

## 2019-01-31 NOTE — PROCEDURE
[IUD Placement] : intrauterine device (IUD) placement [Prevention of Pregnancy] : prevention of pregnancy [Risks] : risks [Benefits] : benefits [Alternatives] : alternatives [Patient] : patient [Infection] : infection [Bleeding] : bleeding [Pain] : pain [Expulsion] : expulsion [Failure] : failure [Uterine Perforation] : uterine perforation [CONSENT OBTAINED] : written consent was obtained prior to the procedure. [Neg Pregnancy Test] : a pregnancy test was negative [Betadine] : Prepped with Betadine [Uterus Sounded to ___cm] : sounded to [unfilled]Ucm [Ring Forceps] : a ring forceps [Post Placement Transvag. US] : post placement transvaginal ultrasound completed [Tolerated Well] : the patient tolerated the procedure well [No Complications] : there were no complications

## 2019-02-02 LAB
C TRACH RRNA SPEC QL NAA+PROBE: NOT DETECTED
N GONORRHOEA RRNA SPEC QL NAA+PROBE: NOT DETECTED
SOURCE AMPLIFICATION: NORMAL

## 2019-04-30 ENCOUNTER — ASOB RESULT (OUTPATIENT)
Age: 44
End: 2019-04-30

## 2019-04-30 ENCOUNTER — TRANSCRIPTION ENCOUNTER (OUTPATIENT)
Age: 44
End: 2019-04-30

## 2019-04-30 ENCOUNTER — APPOINTMENT (OUTPATIENT)
Dept: GYNECOLOGIC ONCOLOGY | Facility: CLINIC | Age: 44
End: 2019-04-30
Payer: MEDICAID

## 2019-04-30 ENCOUNTER — APPOINTMENT (OUTPATIENT)
Dept: OBGYN | Facility: CLINIC | Age: 44
End: 2019-04-30
Payer: MEDICAID

## 2019-04-30 VITALS
HEART RATE: 85 BPM | HEIGHT: 67 IN | DIASTOLIC BLOOD PRESSURE: 93 MMHG | WEIGHT: 205 LBS | BODY MASS INDEX: 32.18 KG/M2 | SYSTOLIC BLOOD PRESSURE: 155 MMHG

## 2019-04-30 PROCEDURE — XXXXX: CPT

## 2019-04-30 PROCEDURE — 76830 TRANSVAGINAL US NON-OB: CPT

## 2019-08-12 ENCOUNTER — APPOINTMENT (OUTPATIENT)
Dept: ULTRASOUND IMAGING | Facility: IMAGING CENTER | Age: 44
End: 2019-08-12
Payer: MEDICAID

## 2019-08-12 ENCOUNTER — OUTPATIENT (OUTPATIENT)
Dept: OUTPATIENT SERVICES | Facility: HOSPITAL | Age: 44
LOS: 1 days | End: 2019-08-12
Payer: MEDICAID

## 2019-08-12 DIAGNOSIS — Z87.59 PERSONAL HISTORY OF OTHER COMPLICATIONS OF PREGNANCY, CHILDBIRTH AND THE PUERPERIUM: Chronic | ICD-10-CM

## 2019-08-12 DIAGNOSIS — Z98.890 OTHER SPECIFIED POSTPROCEDURAL STATES: Chronic | ICD-10-CM

## 2019-08-12 DIAGNOSIS — D39.10 NEOPLASM OF UNCERTAIN BEHAVIOR OF UNSPECIFIED OVARY: ICD-10-CM

## 2019-08-12 PROCEDURE — 76830 TRANSVAGINAL US NON-OB: CPT

## 2019-08-12 PROCEDURE — 76830 TRANSVAGINAL US NON-OB: CPT | Mod: 26

## 2019-09-03 ENCOUNTER — APPOINTMENT (OUTPATIENT)
Dept: GYNECOLOGIC ONCOLOGY | Facility: CLINIC | Age: 44
End: 2019-09-03
Payer: MEDICAID

## 2019-09-03 VITALS
SYSTOLIC BLOOD PRESSURE: 138 MMHG | HEART RATE: 86 BPM | HEIGHT: 67 IN | BODY MASS INDEX: 31.86 KG/M2 | DIASTOLIC BLOOD PRESSURE: 82 MMHG | WEIGHT: 203 LBS

## 2019-09-03 PROCEDURE — XXXXX: CPT

## 2020-02-02 ENCOUNTER — FORM ENCOUNTER (OUTPATIENT)
Age: 45
End: 2020-02-02

## 2020-02-03 ENCOUNTER — APPOINTMENT (OUTPATIENT)
Dept: MAMMOGRAPHY | Facility: IMAGING CENTER | Age: 45
End: 2020-02-03
Payer: MEDICAID

## 2020-02-03 ENCOUNTER — APPOINTMENT (OUTPATIENT)
Dept: ULTRASOUND IMAGING | Facility: IMAGING CENTER | Age: 45
End: 2020-02-03
Payer: MEDICAID

## 2020-02-03 ENCOUNTER — OUTPATIENT (OUTPATIENT)
Dept: OUTPATIENT SERVICES | Facility: HOSPITAL | Age: 45
LOS: 1 days | End: 2020-02-03
Payer: MEDICAID

## 2020-02-03 DIAGNOSIS — Z00.8 ENCOUNTER FOR OTHER GENERAL EXAMINATION: ICD-10-CM

## 2020-02-03 DIAGNOSIS — Z87.59 PERSONAL HISTORY OF OTHER COMPLICATIONS OF PREGNANCY, CHILDBIRTH AND THE PUERPERIUM: Chronic | ICD-10-CM

## 2020-02-03 DIAGNOSIS — Z98.890 OTHER SPECIFIED POSTPROCEDURAL STATES: Chronic | ICD-10-CM

## 2020-02-03 PROCEDURE — 77067 SCR MAMMO BI INCL CAD: CPT | Mod: 26

## 2020-02-03 PROCEDURE — 77063 BREAST TOMOSYNTHESIS BI: CPT

## 2020-02-03 PROCEDURE — 77067 SCR MAMMO BI INCL CAD: CPT

## 2020-02-03 PROCEDURE — 77063 BREAST TOMOSYNTHESIS BI: CPT | Mod: 26

## 2020-02-09 ENCOUNTER — FORM ENCOUNTER (OUTPATIENT)
Age: 45
End: 2020-02-09

## 2020-02-10 ENCOUNTER — APPOINTMENT (OUTPATIENT)
Dept: ULTRASOUND IMAGING | Facility: IMAGING CENTER | Age: 45
End: 2020-02-10
Payer: MEDICAID

## 2020-02-10 ENCOUNTER — OUTPATIENT (OUTPATIENT)
Dept: OUTPATIENT SERVICES | Facility: HOSPITAL | Age: 45
LOS: 1 days | End: 2020-02-10
Payer: MEDICAID

## 2020-02-10 DIAGNOSIS — Z98.890 OTHER SPECIFIED POSTPROCEDURAL STATES: Chronic | ICD-10-CM

## 2020-02-10 DIAGNOSIS — Z87.59 PERSONAL HISTORY OF OTHER COMPLICATIONS OF PREGNANCY, CHILDBIRTH AND THE PUERPERIUM: Chronic | ICD-10-CM

## 2020-02-10 DIAGNOSIS — Z00.8 ENCOUNTER FOR OTHER GENERAL EXAMINATION: ICD-10-CM

## 2020-02-10 PROCEDURE — 76642 ULTRASOUND BREAST LIMITED: CPT | Mod: 26,LT

## 2020-02-10 PROCEDURE — 76642 ULTRASOUND BREAST LIMITED: CPT

## 2020-08-13 ENCOUNTER — APPOINTMENT (OUTPATIENT)
Dept: OBGYN | Facility: CLINIC | Age: 45
End: 2020-08-13
Payer: MEDICAID

## 2020-08-13 VITALS
BODY MASS INDEX: 30.76 KG/M2 | DIASTOLIC BLOOD PRESSURE: 87 MMHG | HEIGHT: 67 IN | WEIGHT: 196 LBS | HEART RATE: 84 BPM | SYSTOLIC BLOOD PRESSURE: 143 MMHG | TEMPERATURE: 98.8 F

## 2020-08-13 DIAGNOSIS — Z01.419 ENCOUNTER FOR GYNECOLOGICAL EXAMINATION (GENERAL) (ROUTINE) W/OUT ABNORMAL FINDINGS: ICD-10-CM

## 2020-08-13 PROCEDURE — 99396 PREV VISIT EST AGE 40-64: CPT

## 2020-09-08 ENCOUNTER — APPOINTMENT (OUTPATIENT)
Dept: GYNECOLOGIC ONCOLOGY | Facility: CLINIC | Age: 45
End: 2020-09-08
Payer: MEDICAID

## 2020-09-08 VITALS
WEIGHT: 188 LBS | SYSTOLIC BLOOD PRESSURE: 149 MMHG | HEIGHT: 67 IN | HEART RATE: 74 BPM | DIASTOLIC BLOOD PRESSURE: 90 MMHG | BODY MASS INDEX: 29.51 KG/M2

## 2020-09-08 PROCEDURE — XXXXX: CPT

## 2020-11-09 ENCOUNTER — OUTPATIENT (OUTPATIENT)
Dept: OUTPATIENT SERVICES | Facility: HOSPITAL | Age: 45
LOS: 1 days | End: 2020-11-09
Payer: MEDICAID

## 2020-11-09 ENCOUNTER — RESULT REVIEW (OUTPATIENT)
Age: 45
End: 2020-11-09

## 2020-11-09 ENCOUNTER — APPOINTMENT (OUTPATIENT)
Dept: ULTRASOUND IMAGING | Facility: IMAGING CENTER | Age: 45
End: 2020-11-09
Payer: MEDICAID

## 2020-11-09 DIAGNOSIS — Z87.59 PERSONAL HISTORY OF OTHER COMPLICATIONS OF PREGNANCY, CHILDBIRTH AND THE PUERPERIUM: Chronic | ICD-10-CM

## 2020-11-09 DIAGNOSIS — D39.10 NEOPLASM OF UNCERTAIN BEHAVIOR OF UNSPECIFIED OVARY: ICD-10-CM

## 2020-11-09 DIAGNOSIS — Z98.890 OTHER SPECIFIED POSTPROCEDURAL STATES: Chronic | ICD-10-CM

## 2020-11-09 PROCEDURE — 76856 US EXAM PELVIC COMPLETE: CPT | Mod: 26

## 2020-11-09 PROCEDURE — 76830 TRANSVAGINAL US NON-OB: CPT | Mod: 26

## 2020-11-09 PROCEDURE — 76856 US EXAM PELVIC COMPLETE: CPT

## 2020-11-09 PROCEDURE — 76830 TRANSVAGINAL US NON-OB: CPT

## 2020-11-11 ENCOUNTER — NON-APPOINTMENT (OUTPATIENT)
Age: 45
End: 2020-11-11

## 2020-11-11 NOTE — REASON FOR VISIT
[FreeTextEntry1] : Returns for interval f/u of\par Borderline Ovarian Tumor (s/p  right salpingo-oophorectomy)  - 10/15/18

## 2020-12-23 PROBLEM — Z01.419 ENCOUNTER FOR ANNUAL ROUTINE GYNECOLOGICAL EXAMINATION: Status: RESOLVED | Noted: 2019-01-09 | Resolved: 2020-12-23

## 2021-03-26 ENCOUNTER — RESULT REVIEW (OUTPATIENT)
Age: 46
End: 2021-03-26

## 2021-03-26 ENCOUNTER — OUTPATIENT (OUTPATIENT)
Dept: OUTPATIENT SERVICES | Facility: HOSPITAL | Age: 46
LOS: 1 days | End: 2021-03-26
Payer: MEDICAID

## 2021-03-26 ENCOUNTER — APPOINTMENT (OUTPATIENT)
Dept: MAMMOGRAPHY | Facility: IMAGING CENTER | Age: 46
End: 2021-03-26
Payer: MEDICAID

## 2021-03-26 DIAGNOSIS — Z98.890 OTHER SPECIFIED POSTPROCEDURAL STATES: Chronic | ICD-10-CM

## 2021-03-26 DIAGNOSIS — Z87.59 PERSONAL HISTORY OF OTHER COMPLICATIONS OF PREGNANCY, CHILDBIRTH AND THE PUERPERIUM: Chronic | ICD-10-CM

## 2021-03-26 DIAGNOSIS — Z00.8 ENCOUNTER FOR OTHER GENERAL EXAMINATION: ICD-10-CM

## 2021-03-26 PROCEDURE — 77067 SCR MAMMO BI INCL CAD: CPT | Mod: 26

## 2021-03-26 PROCEDURE — 77063 BREAST TOMOSYNTHESIS BI: CPT

## 2021-03-26 PROCEDURE — 77067 SCR MAMMO BI INCL CAD: CPT

## 2021-03-26 PROCEDURE — 77063 BREAST TOMOSYNTHESIS BI: CPT | Mod: 26

## 2021-05-18 ENCOUNTER — OUTPATIENT (OUTPATIENT)
Dept: OUTPATIENT SERVICES | Facility: HOSPITAL | Age: 46
LOS: 1 days | End: 2021-05-18
Payer: MEDICAID

## 2021-05-18 ENCOUNTER — APPOINTMENT (OUTPATIENT)
Dept: MRI IMAGING | Facility: IMAGING CENTER | Age: 46
End: 2021-05-18
Payer: MEDICAID

## 2021-05-18 DIAGNOSIS — Z98.890 OTHER SPECIFIED POSTPROCEDURAL STATES: Chronic | ICD-10-CM

## 2021-05-18 DIAGNOSIS — Z87.59 PERSONAL HISTORY OF OTHER COMPLICATIONS OF PREGNANCY, CHILDBIRTH AND THE PUERPERIUM: Chronic | ICD-10-CM

## 2021-05-18 DIAGNOSIS — Z00.8 ENCOUNTER FOR OTHER GENERAL EXAMINATION: ICD-10-CM

## 2021-05-18 DIAGNOSIS — Z00.00 ENCOUNTER FOR GENERAL ADULT MEDICAL EXAMINATION WITHOUT ABNORMAL FINDINGS: ICD-10-CM

## 2021-05-18 PROCEDURE — A9585: CPT

## 2021-05-18 PROCEDURE — C8908: CPT

## 2021-05-18 PROCEDURE — C8937: CPT

## 2021-05-18 PROCEDURE — 77049 MRI BREAST C-+ W/CAD BI: CPT | Mod: 26

## 2021-05-21 ENCOUNTER — APPOINTMENT (OUTPATIENT)
Dept: DISASTER EMERGENCY | Facility: OTHER | Age: 46
End: 2021-05-21
Payer: MEDICAID

## 2021-05-21 PROCEDURE — 0001A: CPT

## 2021-06-11 ENCOUNTER — APPOINTMENT (OUTPATIENT)
Dept: DISASTER EMERGENCY | Facility: OTHER | Age: 46
End: 2021-06-11
Payer: MEDICAID

## 2021-06-11 PROCEDURE — 0002A: CPT

## 2021-09-07 PROBLEM — D39.10 OVARIAN TUMOR OF BORDERLINE MALIGNANCY, UNSPECIFIED LATERALITY: Status: ACTIVE | Noted: 2018-10-30

## 2021-09-10 ENCOUNTER — APPOINTMENT (OUTPATIENT)
Dept: GYNECOLOGIC ONCOLOGY | Facility: CLINIC | Age: 46
End: 2021-09-10

## 2021-09-10 DIAGNOSIS — D39.10 NEOPLASM OF UNCERTAIN BEHAVIOR OF UNSPECIFIED OVARY: ICD-10-CM

## 2021-10-28 ENCOUNTER — NON-APPOINTMENT (OUTPATIENT)
Age: 46
End: 2021-10-28

## 2021-11-04 ENCOUNTER — APPOINTMENT (OUTPATIENT)
Dept: GYNECOLOGIC ONCOLOGY | Facility: CLINIC | Age: 46
End: 2021-11-04

## 2021-11-26 ENCOUNTER — APPOINTMENT (OUTPATIENT)
Dept: MRI IMAGING | Facility: CLINIC | Age: 46
End: 2021-11-26

## 2021-11-30 ENCOUNTER — APPOINTMENT (OUTPATIENT)
Dept: MRI IMAGING | Facility: CLINIC | Age: 46
End: 2021-11-30

## 2022-03-23 NOTE — H&P PST ADULT - PMH
Left ovarian cyst    Right ovarian cyst    Unspecified condition associated with female genital organs and menstrual cycle
none

## 2022-06-07 NOTE — ASU PATIENT PROFILE, ADULT - PRO MENTAL HEALTH SX RECENT
Consent: Written consent obtained.  The risks were reviewed with the patient including but not limited to: burn, pigmentary changes, pain, blistering, scabbing, redness, increased risk of skin cancers, and the remote possibility of scarring. none

## 2022-12-05 ENCOUNTER — APPOINTMENT (OUTPATIENT)
Dept: OBGYN | Facility: CLINIC | Age: 47
End: 2022-12-05

## 2022-12-05 VITALS
HEIGHT: 67 IN | SYSTOLIC BLOOD PRESSURE: 149 MMHG | BODY MASS INDEX: 31.23 KG/M2 | HEART RATE: 103 BPM | WEIGHT: 199 LBS | DIASTOLIC BLOOD PRESSURE: 90 MMHG

## 2022-12-05 DIAGNOSIS — Z12.11 ENCOUNTER FOR SCREENING FOR MALIGNANT NEOPLASM OF COLON: ICD-10-CM

## 2022-12-05 DIAGNOSIS — Z91.89 OTHER SPECIFIED PERSONAL RISK FACTORS, NOT ELSEWHERE CLASSIFIED: ICD-10-CM

## 2022-12-05 DIAGNOSIS — Z01.419 ENCOUNTER FOR GYNECOLOGICAL EXAMINATION (GENERAL) (ROUTINE) W/OUT ABNORMAL FINDINGS: ICD-10-CM

## 2022-12-05 DIAGNOSIS — Z12.39 ENCOUNTER FOR OTHER SCREENING FOR MALIGNANT NEOPLASM OF BREAST: ICD-10-CM

## 2022-12-05 DIAGNOSIS — Z30.431 ENCOUNTER FOR ROUTINE CHECKING OF INTRAUTERINE CONTRACEPTIVE DEVICE: ICD-10-CM

## 2022-12-05 PROCEDURE — 99396 PREV VISIT EST AGE 40-64: CPT

## 2022-12-05 RX ORDER — POTASSIUM CHLORIDE 750 MG/1
10 TABLET, FILM COATED, EXTENDED RELEASE ORAL 3 TIMES DAILY
Qty: 9 | Refills: 0 | Status: COMPLETED | COMMUNITY
Start: 2018-10-09 | End: 2022-12-05

## 2022-12-06 LAB — HPV HIGH+LOW RISK DNA PNL CVX: NOT DETECTED

## 2022-12-15 LAB — CYTOLOGY CVX/VAG DOC THIN PREP: NORMAL

## 2023-01-06 ENCOUNTER — APPOINTMENT (OUTPATIENT)
Dept: ULTRASOUND IMAGING | Facility: IMAGING CENTER | Age: 48
End: 2023-01-06
Payer: MEDICAID

## 2023-01-06 ENCOUNTER — OUTPATIENT (OUTPATIENT)
Dept: OUTPATIENT SERVICES | Facility: HOSPITAL | Age: 48
LOS: 1 days | End: 2023-01-06
Payer: MEDICAID

## 2023-01-06 ENCOUNTER — APPOINTMENT (OUTPATIENT)
Dept: MAMMOGRAPHY | Facility: IMAGING CENTER | Age: 48
End: 2023-01-06
Payer: MEDICAID

## 2023-01-06 ENCOUNTER — RESULT REVIEW (OUTPATIENT)
Age: 48
End: 2023-01-06

## 2023-01-06 DIAGNOSIS — Z91.89 OTHER SPECIFIED PERSONAL RISK FACTORS, NOT ELSEWHERE CLASSIFIED: ICD-10-CM

## 2023-01-06 DIAGNOSIS — Z87.59 PERSONAL HISTORY OF OTHER COMPLICATIONS OF PREGNANCY, CHILDBIRTH AND THE PUERPERIUM: Chronic | ICD-10-CM

## 2023-01-06 DIAGNOSIS — Z98.890 OTHER SPECIFIED POSTPROCEDURAL STATES: Chronic | ICD-10-CM

## 2023-01-06 DIAGNOSIS — Z12.39 ENCOUNTER FOR OTHER SCREENING FOR MALIGNANT NEOPLASM OF BREAST: ICD-10-CM

## 2023-01-06 DIAGNOSIS — Z30.431 ENCOUNTER FOR ROUTINE CHECKING OF INTRAUTERINE CONTRACEPTIVE DEVICE: ICD-10-CM

## 2023-01-06 PROCEDURE — 77063 BREAST TOMOSYNTHESIS BI: CPT | Mod: 26

## 2023-01-06 PROCEDURE — 76641 ULTRASOUND BREAST COMPLETE: CPT | Mod: 26,50

## 2023-01-06 PROCEDURE — 76830 TRANSVAGINAL US NON-OB: CPT | Mod: 26

## 2023-01-06 PROCEDURE — 76856 US EXAM PELVIC COMPLETE: CPT | Mod: 26,59

## 2023-01-06 PROCEDURE — 77063 BREAST TOMOSYNTHESIS BI: CPT

## 2023-01-06 PROCEDURE — 77067 SCR MAMMO BI INCL CAD: CPT | Mod: 26

## 2023-01-06 PROCEDURE — 77067 SCR MAMMO BI INCL CAD: CPT

## 2023-01-06 PROCEDURE — 76856 US EXAM PELVIC COMPLETE: CPT | Mod: 26

## 2023-01-06 PROCEDURE — 76641 ULTRASOUND BREAST COMPLETE: CPT

## 2023-01-06 PROCEDURE — 76830 TRANSVAGINAL US NON-OB: CPT

## 2023-01-06 PROCEDURE — 76856 US EXAM PELVIC COMPLETE: CPT

## 2023-01-06 PROCEDURE — 76641 ULTRASOUND BREAST COMPLETE: CPT | Mod: 26,RT

## 2023-01-12 NOTE — PHYSICAL EXAM
[Chaperone Present] : A chaperone was present in the examining room during all aspects of the physical examination [Labia Majora] : normal [Labia Minora] : normal [IUD String] : an IUD string was noted [Normal] : normal [Declined] : Patient declined rectal exam [Appropriately responsive] : appropriately responsive [Alert] : alert [No Acute Distress] : no acute distress [No Lymphadenopathy] : no lymphadenopathy [Regular Rate Rhythm] : regular rate rhythm [Soft] : soft [Non-tender] : non-tender [Non-distended] : non-distended [No Mass] : no mass [Oriented x3] : oriented x3 [Uterine Adnexae] : normal  [FreeTextEntry6] : no gross adnexal masses palpated

## 2023-01-12 NOTE — DISCUSSION/SUMMARY
[FreeTextEntry1] : Annual gyn [facility]- presents for an annual gyn preventitive care  exam\par unremarkable exam

## 2023-01-12 NOTE — HISTORY OF PRESENT ILLNESS
[No] : Patient does not have concerns regarding sex [LMP unknown] : LMP unknown [N] : Patient reports normal menses [Y] : Positive pregnancy history [Mammogramdate] : 2021 [TextBox_19] : breast mri for high Tyer cuzik score 2021 [BreastSonogramDate] : 2021 [PapSmeardate] : 2019 [BoneDensityDate] : NA [ColonoscopyDate] : never [TextBox_102] : ? pt with mirena iud [PGHxTotal] : 3 [[facility]] : 3 [[facility] Hospital[facility]Living] : 2 [FreeTextEntry1] : one still born 9/20/2014 [FreeTextEntry3] : IUD in situ

## 2023-01-13 ENCOUNTER — NON-APPOINTMENT (OUTPATIENT)
Age: 48
End: 2023-01-13

## 2023-03-28 ENCOUNTER — APPOINTMENT (OUTPATIENT)
Dept: MRI IMAGING | Facility: CLINIC | Age: 48
End: 2023-03-28

## 2023-04-03 ENCOUNTER — TRANSCRIPTION ENCOUNTER (OUTPATIENT)
Age: 48
End: 2023-04-03

## 2023-04-21 ENCOUNTER — APPOINTMENT (OUTPATIENT)
Dept: MRI IMAGING | Facility: IMAGING CENTER | Age: 48
End: 2023-04-21

## 2023-06-19 NOTE — ED PROVIDER NOTE - CAS EDP CONSULT REGARDING 1
What Type Of Note Output Would You Prefer (Optional)?: Standard Output How Severe Is Your Skin Lesion?: moderate Has Your Skin Lesion Been Treated?: not been treated Is This A New Presentation, Or A Follow-Up?: Skin Lesion consult

## 2023-12-17 NOTE — PATIENT PROFILE ADULT - NSPROPTRIGHTREPNAME_GEN_A__NUR
"Chief Complaint   Patient presents with    Mental Status Change     BIBA from home for AMS. Per EMS family reports pt LKW 1100. Per EMS family called pt PCP at 1100 to report pt was SOB. Family at home +covid.  Pt woke up at 1845 altered and weak. On scene VSS, GCS 14, -NIH.      On arrival pt denies SOB, SpO2 89-91% on RA. Pt AAOx1.        BP (!) 143/102   Pulse 91   Resp 16   Ht 1.626 m (5' 4\")   Wt 67.6 kg (149 lb)   SpO2 91%   BMI 25.58 kg/m²       "
Roney Jazmin

## 2024-04-03 ENCOUNTER — APPOINTMENT (OUTPATIENT)
Dept: ULTRASOUND IMAGING | Facility: CLINIC | Age: 49
End: 2024-04-03

## 2024-04-12 ENCOUNTER — APPOINTMENT (OUTPATIENT)
Dept: ULTRASOUND IMAGING | Facility: CLINIC | Age: 49
End: 2024-04-12
Payer: COMMERCIAL

## 2024-04-12 ENCOUNTER — OUTPATIENT (OUTPATIENT)
Dept: OUTPATIENT SERVICES | Facility: HOSPITAL | Age: 49
LOS: 1 days | End: 2024-04-12
Payer: MEDICAID

## 2024-04-12 DIAGNOSIS — Z00.00 ENCOUNTER FOR GENERAL ADULT MEDICAL EXAMINATION WITHOUT ABNORMAL FINDINGS: ICD-10-CM

## 2024-04-12 DIAGNOSIS — Z98.890 OTHER SPECIFIED POSTPROCEDURAL STATES: Chronic | ICD-10-CM

## 2024-04-12 DIAGNOSIS — Z87.59 PERSONAL HISTORY OF OTHER COMPLICATIONS OF PREGNANCY, CHILDBIRTH AND THE PUERPERIUM: Chronic | ICD-10-CM

## 2024-04-12 PROCEDURE — 76830 TRANSVAGINAL US NON-OB: CPT | Mod: 26

## 2024-04-12 PROCEDURE — 76700 US EXAM ABDOM COMPLETE: CPT | Mod: 26

## 2024-04-12 PROCEDURE — 76856 US EXAM PELVIC COMPLETE: CPT

## 2024-04-12 PROCEDURE — 76856 US EXAM PELVIC COMPLETE: CPT | Mod: 26

## 2024-04-12 PROCEDURE — 76830 TRANSVAGINAL US NON-OB: CPT

## 2024-04-12 PROCEDURE — 76700 US EXAM ABDOM COMPLETE: CPT
